# Patient Record
Sex: FEMALE | Race: WHITE | URBAN - METROPOLITAN AREA
[De-identification: names, ages, dates, MRNs, and addresses within clinical notes are randomized per-mention and may not be internally consistent; named-entity substitution may affect disease eponyms.]

---

## 2017-11-25 ENCOUNTER — EMERGENCY (EMERGENCY)
Facility: HOSPITAL | Age: 22
LOS: 0 days | Discharge: HOME | End: 2017-11-25

## 2017-11-25 DIAGNOSIS — G40.909 EPILEPSY, UNSPECIFIED, NOT INTRACTABLE, WITHOUT STATUS EPILEPTICUS: ICD-10-CM

## 2017-11-25 DIAGNOSIS — Z79.899 OTHER LONG TERM (CURRENT) DRUG THERAPY: ICD-10-CM

## 2019-12-18 ENCOUNTER — INPATIENT (INPATIENT)
Facility: HOSPITAL | Age: 24
LOS: 9 days | Discharge: HOME HEALTH | End: 2019-12-28
Attending: PLASTIC SURGERY | Admitting: PLASTIC SURGERY
Payer: COMMERCIAL

## 2019-12-18 VITALS
RESPIRATION RATE: 19 BRPM | DIASTOLIC BLOOD PRESSURE: 75 MMHG | WEIGHT: 179.9 LBS | TEMPERATURE: 98 F | SYSTOLIC BLOOD PRESSURE: 104 MMHG | OXYGEN SATURATION: 98 % | HEART RATE: 81 BPM
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
## 2019-12-18 DIAGNOSIS — S00.03XA CONTUSION OF SCALP, INITIAL ENCOUNTER: ICD-10-CM

## 2019-12-18 DIAGNOSIS — Z79.899 OTHER LONG TERM (CURRENT) DRUG THERAPY: ICD-10-CM

## 2019-12-18 DIAGNOSIS — T22.391A BURN OF THIRD DEGREE OF MULTIPLE SITES OF RIGHT SHOULDER AND UPPER LIMB, EXCEPT WRIST AND HAND, INITIAL ENCOUNTER: ICD-10-CM
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
## 2019-12-18 DIAGNOSIS — G40.909 EPILEPSY, UNSPECIFIED, NOT INTRACTABLE, WITHOUT STATUS EPILEPTICUS: ICD-10-CM

## 2019-12-18 DIAGNOSIS — T31.0 BURNS INVOLVING LESS THAN 10% OF BODY SURFACE: ICD-10-CM

## 2019-12-18 DIAGNOSIS — R71.0 PRECIPITOUS DROP IN HEMATOCRIT: ICD-10-CM

## 2019-12-18 DIAGNOSIS — T20.39XA BURN OF THIRD DEGREE OF MULTIPLE SITES OF HEAD, FACE, AND NECK, INITIAL ENCOUNTER: ICD-10-CM

## 2019-12-18 LAB
ALBUMIN SERPL ELPH-MCNC: 4.4 G/DL — SIGNIFICANT CHANGE UP (ref 3.5–5.2)
ALP SERPL-CCNC: 50 U/L — SIGNIFICANT CHANGE UP (ref 30–115)
ALT FLD-CCNC: 22 U/L — SIGNIFICANT CHANGE UP (ref 0–41)
ANION GAP SERPL CALC-SCNC: 16 MMOL/L — HIGH (ref 7–14)
APPEARANCE UR: CLEAR — SIGNIFICANT CHANGE UP
APTT BLD: 28.1 SEC — SIGNIFICANT CHANGE UP (ref 27–39.2)
AST SERPL-CCNC: 21 U/L — SIGNIFICANT CHANGE UP (ref 0–41)
BACTERIA # UR AUTO: ABNORMAL
BASOPHILS # BLD AUTO: 0.03 K/UL — SIGNIFICANT CHANGE UP (ref 0–0.2)
BASOPHILS NFR BLD AUTO: 0.2 % — SIGNIFICANT CHANGE UP (ref 0–1)
BILIRUB SERPL-MCNC: 0.4 MG/DL — SIGNIFICANT CHANGE UP (ref 0.2–1.2)
BILIRUB UR-MCNC: NEGATIVE — SIGNIFICANT CHANGE UP
BUN SERPL-MCNC: 12 MG/DL — SIGNIFICANT CHANGE UP (ref 10–20)
CALCIUM SERPL-MCNC: 9.2 MG/DL — SIGNIFICANT CHANGE UP (ref 8.5–10.1)
CHLORIDE SERPL-SCNC: 97 MMOL/L — LOW (ref 98–110)
CO2 SERPL-SCNC: 21 MMOL/L — SIGNIFICANT CHANGE UP (ref 17–32)
COLOR SPEC: YELLOW — SIGNIFICANT CHANGE UP
CREAT SERPL-MCNC: 0.6 MG/DL — LOW (ref 0.7–1.5)
DIFF PNL FLD: NEGATIVE — SIGNIFICANT CHANGE UP
EOSINOPHIL # BLD AUTO: 0.03 K/UL — SIGNIFICANT CHANGE UP (ref 0–0.7)
EOSINOPHIL NFR BLD AUTO: 0.2 % — SIGNIFICANT CHANGE UP (ref 0–8)
EPI CELLS # UR: ABNORMAL /HPF
GLUCOSE SERPL-MCNC: 88 MG/DL — SIGNIFICANT CHANGE UP (ref 70–99)
GLUCOSE UR QL: NEGATIVE MG/DL — SIGNIFICANT CHANGE UP
HCG SERPL QL: NEGATIVE — SIGNIFICANT CHANGE UP
HCT VFR BLD CALC: 44.9 % — SIGNIFICANT CHANGE UP (ref 37–47)
HGB BLD-MCNC: 15 G/DL — SIGNIFICANT CHANGE UP (ref 12–16)
IMM GRANULOCYTES NFR BLD AUTO: 0.2 % — SIGNIFICANT CHANGE UP (ref 0.1–0.3)
INR BLD: 1.04 RATIO — SIGNIFICANT CHANGE UP (ref 0.65–1.3)
KETONES UR-MCNC: NEGATIVE — SIGNIFICANT CHANGE UP
LEUKOCYTE ESTERASE UR-ACNC: ABNORMAL
LYMPHOCYTES # BLD AUTO: 1.41 K/UL — SIGNIFICANT CHANGE UP (ref 1.2–3.4)
LYMPHOCYTES # BLD AUTO: 11.1 % — LOW (ref 20.5–51.1)
MAGNESIUM SERPL-MCNC: 1.9 MG/DL — SIGNIFICANT CHANGE UP (ref 1.8–2.4)
MCHC RBC-ENTMCNC: 30.2 PG — SIGNIFICANT CHANGE UP (ref 27–31)
MCHC RBC-ENTMCNC: 33.4 G/DL — SIGNIFICANT CHANGE UP (ref 32–37)
MCV RBC AUTO: 90.3 FL — SIGNIFICANT CHANGE UP (ref 81–99)
MONOCYTES # BLD AUTO: 0.48 K/UL — SIGNIFICANT CHANGE UP (ref 0.1–0.6)
MONOCYTES NFR BLD AUTO: 3.8 % — SIGNIFICANT CHANGE UP (ref 1.7–9.3)
NEUTROPHILS # BLD AUTO: 10.68 K/UL — HIGH (ref 1.4–6.5)
NEUTROPHILS NFR BLD AUTO: 84.5 % — HIGH (ref 42.2–75.2)
NITRITE UR-MCNC: NEGATIVE — SIGNIFICANT CHANGE UP
NRBC # BLD: 0 /100 WBCS — SIGNIFICANT CHANGE UP (ref 0–0)
PH UR: 6 — SIGNIFICANT CHANGE UP (ref 5–8)
PLATELET # BLD AUTO: 222 K/UL — SIGNIFICANT CHANGE UP (ref 130–400)
POTASSIUM SERPL-MCNC: 3.8 MMOL/L — SIGNIFICANT CHANGE UP (ref 3.5–5)
POTASSIUM SERPL-SCNC: 3.8 MMOL/L — SIGNIFICANT CHANGE UP (ref 3.5–5)
PROT SERPL-MCNC: 7.1 G/DL — SIGNIFICANT CHANGE UP (ref 6–8)
PROT UR-MCNC: ABNORMAL MG/DL
PROTHROM AB SERPL-ACNC: 12 SEC — SIGNIFICANT CHANGE UP (ref 9.95–12.87)
RBC # BLD: 4.97 M/UL — SIGNIFICANT CHANGE UP (ref 4.2–5.4)
RBC # FLD: 12.6 % — SIGNIFICANT CHANGE UP (ref 11.5–14.5)
SODIUM SERPL-SCNC: 134 MMOL/L — LOW (ref 135–146)
SP GR SPEC: 1.02 — SIGNIFICANT CHANGE UP (ref 1.01–1.03)
UROBILINOGEN FLD QL: 0.2 MG/DL — SIGNIFICANT CHANGE UP (ref 0.2–0.2)
WBC # BLD: 12.66 K/UL — HIGH (ref 4.8–10.8)
WBC # FLD AUTO: 12.66 K/UL — HIGH (ref 4.8–10.8)
WBC UR QL: SIGNIFICANT CHANGE UP /HPF

## 2019-12-18 PROCEDURE — 70486 CT MAXILLOFACIAL W/O DYE: CPT | Mod: 26

## 2019-12-18 PROCEDURE — 70450 CT HEAD/BRAIN W/O DYE: CPT | Mod: 26

## 2019-12-18 PROCEDURE — 99285 EMERGENCY DEPT VISIT HI MDM: CPT

## 2019-12-18 PROCEDURE — 99223 1ST HOSP IP/OBS HIGH 75: CPT

## 2019-12-18 RX ORDER — BACITRACIN ZINC 500 UNIT/G
1 OINTMENT IN PACKET (EA) TOPICAL
Refills: 0 | Status: DISCONTINUED | OUTPATIENT
Start: 2019-12-18 | End: 2019-12-23

## 2019-12-18 RX ORDER — NORETHINDRONE AND ETHINYL ESTRADIOL 0.4-0.035
1 KIT ORAL
Qty: 0 | Refills: 0 | DISCHARGE

## 2019-12-18 RX ORDER — INFLUENZA VIRUS VACCINE 15; 15; 15; 15 UG/.5ML; UG/.5ML; UG/.5ML; UG/.5ML
0.5 SUSPENSION INTRAMUSCULAR ONCE
Refills: 0 | Status: COMPLETED | OUTPATIENT
Start: 2019-12-18 | End: 2019-12-28

## 2019-12-18 RX ORDER — LEVETIRACETAM 250 MG/1
500 TABLET, FILM COATED ORAL DAILY
Refills: 0 | Status: DISCONTINUED | OUTPATIENT
Start: 2019-12-18 | End: 2019-12-19

## 2019-12-18 RX ORDER — SODIUM CHLORIDE 9 MG/ML
1000 INJECTION INTRAMUSCULAR; INTRAVENOUS; SUBCUTANEOUS ONCE
Refills: 0 | Status: COMPLETED | OUTPATIENT
Start: 2019-12-18 | End: 2019-12-18

## 2019-12-18 RX ORDER — NORETHINDRONE AND ETHINYL ESTRADIOL 0.4-0.035
1 KIT ORAL DAILY
Refills: 0 | Status: DISCONTINUED | OUTPATIENT
Start: 2019-12-18 | End: 2019-12-23

## 2019-12-18 RX ORDER — SODIUM CHLORIDE 9 MG/ML
1000 INJECTION INTRAMUSCULAR; INTRAVENOUS; SUBCUTANEOUS
Refills: 0 | Status: DISCONTINUED | OUTPATIENT
Start: 2019-12-18 | End: 2019-12-23

## 2019-12-18 RX ORDER — MORPHINE SULFATE 50 MG/1
2 CAPSULE, EXTENDED RELEASE ORAL EVERY 6 HOURS
Refills: 0 | Status: DISCONTINUED | OUTPATIENT
Start: 2019-12-18 | End: 2019-12-18

## 2019-12-18 RX ORDER — OXYCODONE AND ACETAMINOPHEN 5; 325 MG/1; MG/1
2 TABLET ORAL EVERY 6 HOURS
Refills: 0 | Status: DISCONTINUED | OUTPATIENT
Start: 2019-12-18 | End: 2019-12-19

## 2019-12-18 RX ORDER — ENOXAPARIN SODIUM 100 MG/ML
40 INJECTION SUBCUTANEOUS DAILY
Refills: 0 | Status: DISCONTINUED | OUTPATIENT
Start: 2019-12-19 | End: 2019-12-23

## 2019-12-18 RX ORDER — ACETAMINOPHEN 500 MG
650 TABLET ORAL EVERY 6 HOURS
Refills: 0 | Status: DISCONTINUED | OUTPATIENT
Start: 2019-12-18 | End: 2019-12-23

## 2019-12-18 RX ORDER — MORPHINE SULFATE 50 MG/1
2 CAPSULE, EXTENDED RELEASE ORAL EVERY 4 HOURS
Refills: 0 | Status: DISCONTINUED | OUTPATIENT
Start: 2019-12-18 | End: 2019-12-20

## 2019-12-18 RX ORDER — MORPHINE SULFATE 50 MG/1
4 CAPSULE, EXTENDED RELEASE ORAL ONCE
Refills: 0 | Status: DISCONTINUED | OUTPATIENT
Start: 2019-12-18 | End: 2019-12-18

## 2019-12-18 RX ORDER — FELBAMATE 600 MG/1
600 TABLET ORAL THREE TIMES A DAY
Refills: 0 | Status: DISCONTINUED | OUTPATIENT
Start: 2019-12-18 | End: 2019-12-23

## 2019-12-18 RX ORDER — TETANUS TOXOID, REDUCED DIPHTHERIA TOXOID AND ACELLULAR PERTUSSIS VACCINE, ADSORBED 5; 2.5; 8; 8; 2.5 [IU]/.5ML; [IU]/.5ML; UG/.5ML; UG/.5ML; UG/.5ML
0.5 SUSPENSION INTRAMUSCULAR ONCE
Refills: 0 | Status: COMPLETED | OUTPATIENT
Start: 2019-12-18 | End: 2019-12-18

## 2019-12-18 RX ORDER — SENNA PLUS 8.6 MG/1
2 TABLET ORAL AT BEDTIME
Refills: 0 | Status: DISCONTINUED | OUTPATIENT
Start: 2019-12-18 | End: 2019-12-23

## 2019-12-18 RX ORDER — SODIUM CHLORIDE 9 MG/ML
1000 INJECTION, SOLUTION INTRAVENOUS
Refills: 0 | Status: DISCONTINUED | OUTPATIENT
Start: 2019-12-18 | End: 2019-12-18

## 2019-12-18 RX ORDER — NAFCILLIN 10 G/100ML
1 INJECTION, POWDER, FOR SOLUTION INTRAVENOUS EVERY 6 HOURS
Refills: 0 | Status: DISCONTINUED | OUTPATIENT
Start: 2019-12-18 | End: 2019-12-20

## 2019-12-18 RX ORDER — MIDAZOLAM HYDROCHLORIDE 1 MG/ML
1 INJECTION, SOLUTION INTRAMUSCULAR; INTRAVENOUS
Refills: 0 | Status: DISCONTINUED | OUTPATIENT
Start: 2019-12-18 | End: 2019-12-23

## 2019-12-18 RX ORDER — MORPHINE SULFATE 50 MG/1
2 CAPSULE, EXTENDED RELEASE ORAL
Refills: 0 | Status: DISCONTINUED | OUTPATIENT
Start: 2019-12-18 | End: 2019-12-23

## 2019-12-18 RX ORDER — BACITRACIN AND POLYMYXIN B SULFATE 500; 10000 [USP'U]/G; [USP'U]/G
1 OINTMENT TOPICAL
Refills: 0 | Status: DISCONTINUED | OUTPATIENT
Start: 2019-12-18 | End: 2019-12-23

## 2019-12-18 RX ADMIN — MORPHINE SULFATE 2 MILLIGRAM(S): 50 CAPSULE, EXTENDED RELEASE ORAL at 22:34

## 2019-12-18 RX ADMIN — OXYCODONE AND ACETAMINOPHEN 2 TABLET(S): 5; 325 TABLET ORAL at 23:15

## 2019-12-18 RX ADMIN — FELBAMATE 600 MILLIGRAM(S): 600 TABLET ORAL at 22:00

## 2019-12-18 RX ADMIN — NAFCILLIN 100 GRAM(S): 10 INJECTION, POWDER, FOR SOLUTION INTRAVENOUS at 23:11

## 2019-12-18 RX ADMIN — MORPHINE SULFATE 2 MILLIGRAM(S): 50 CAPSULE, EXTENDED RELEASE ORAL at 22:05

## 2019-12-18 RX ADMIN — TETANUS TOXOID, REDUCED DIPHTHERIA TOXOID AND ACELLULAR PERTUSSIS VACCINE, ADSORBED 0.5 MILLILITER(S): 5; 2.5; 8; 8; 2.5 SUSPENSION INTRAMUSCULAR at 13:09

## 2019-12-18 RX ADMIN — MORPHINE SULFATE 2 MILLIGRAM(S): 50 CAPSULE, EXTENDED RELEASE ORAL at 20:55

## 2019-12-18 RX ADMIN — SODIUM CHLORIDE 75 MILLILITER(S): 9 INJECTION INTRAMUSCULAR; INTRAVENOUS; SUBCUTANEOUS at 23:11

## 2019-12-18 RX ADMIN — SODIUM CHLORIDE 1000 MILLILITER(S): 9 INJECTION INTRAMUSCULAR; INTRAVENOUS; SUBCUTANEOUS at 13:10

## 2019-12-18 RX ADMIN — MORPHINE SULFATE 4 MILLIGRAM(S): 50 CAPSULE, EXTENDED RELEASE ORAL at 18:31

## 2019-12-18 RX ADMIN — MORPHINE SULFATE 4 MILLIGRAM(S): 50 CAPSULE, EXTENDED RELEASE ORAL at 13:08

## 2019-12-18 RX ADMIN — MORPHINE SULFATE 2 MILLIGRAM(S): 50 CAPSULE, EXTENDED RELEASE ORAL at 20:27

## 2019-12-18 RX ADMIN — Medication 1 DROP(S): at 22:34
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
## 2019-12-18 NOTE — ED ADULT NURSE NOTE - NSIMPLEMENTINTERV_GEN_ALL_ED
Implemented All Fall with Harm Risk Interventions:  [place] to call system. Call bell, personal items and telephone within reach. Instruct patient to call for assistance. Room bathroom lighting operational. Non-slip footwear when patient is off stretcher. Physically safe environment: no spills, clutter or unnecessary equipment. Stretcher in lowest position, wheels locked, appropriate side rails in place. Provide visual cue, wrist band, yellow gown, etc. Monitor gait and stability. Monitor for mental status changes and reorient to person, place, and time. Review medications for side effects contributing to fall risk. Reinforce activity limits and safety measures with patient and family. Provide visual clues: red socks.
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
## 2019-12-18 NOTE — H&P ADULT - NSHPLABSRESULTS_GEN_ALL_CORE
LABS: All Labs Reviewed:                        15.0   12.66 )-----------( 222      ( 18 Dec 2019 13:10 )             44.9     12-18    134<L>  |  97<L>  |  12  ----------------------------<  88  3.8   |  21  |  0.6<L>    Ca    9.2      18 Dec 2019 13:10  Mg     1.9     12-18    TPro  7.1  /  Alb  4.4  /  TBili  0.4  /  DBili  x   /  AST  21  /  ALT  22  /  AlkPhos  50  12-18    PT/INR - ( 18 Dec 2019 13:10 )   PT: 12.00 sec;   INR: 1.04 ratio         PTT - ( 18 Dec 2019 13:10 )  PTT:28.1 sec       radiology: < from: CT Head No Cont (12.18.19 @ 12:23) >      No acute intracranial hemorrhage, mass effect, or acute osseous fracture.   Right frontotemporal scalp hematoma.    < end of copied text >    < from: CT Maxillofacial No Cont (12.18.19 @ 16:25) >    Right frontal and periorbital extracalvarial soft tissue swelling/hematoma. No evidence of underlying fracture. No evidence of globe injury.    < end of copied text >
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
## 2019-12-19 LAB
ANION GAP SERPL CALC-SCNC: 19 MMOL/L — HIGH (ref 7–14)
BASOPHILS # BLD AUTO: 0.02 K/UL — SIGNIFICANT CHANGE UP (ref 0–0.2)
BASOPHILS NFR BLD AUTO: 0.2 % — SIGNIFICANT CHANGE UP (ref 0–1)
BUN SERPL-MCNC: 7 MG/DL — LOW (ref 10–20)
CALCIUM SERPL-MCNC: 8.8 MG/DL — SIGNIFICANT CHANGE UP (ref 8.5–10.1)
CHLORIDE SERPL-SCNC: 99 MMOL/L — SIGNIFICANT CHANGE UP (ref 98–110)
CO2 SERPL-SCNC: 18 MMOL/L — SIGNIFICANT CHANGE UP (ref 17–32)
CREAT SERPL-MCNC: 0.7 MG/DL — SIGNIFICANT CHANGE UP (ref 0.7–1.5)
EOSINOPHIL # BLD AUTO: 0 K/UL — SIGNIFICANT CHANGE UP (ref 0–0.7)
EOSINOPHIL NFR BLD AUTO: 0 % — SIGNIFICANT CHANGE UP (ref 0–8)
GLUCOSE SERPL-MCNC: 91 MG/DL — SIGNIFICANT CHANGE UP (ref 70–99)
HCT VFR BLD CALC: 44.9 % — SIGNIFICANT CHANGE UP (ref 37–47)
HGB BLD-MCNC: 14.7 G/DL — SIGNIFICANT CHANGE UP (ref 12–16)
IMM GRANULOCYTES NFR BLD AUTO: 0.8 % — HIGH (ref 0.1–0.3)
LYMPHOCYTES # BLD AUTO: 0.9 K/UL — LOW (ref 1.2–3.4)
LYMPHOCYTES # BLD AUTO: 7.1 % — LOW (ref 20.5–51.1)
MAGNESIUM SERPL-MCNC: 1.9 MG/DL — SIGNIFICANT CHANGE UP (ref 1.8–2.4)
MCHC RBC-ENTMCNC: 30.2 PG — SIGNIFICANT CHANGE UP (ref 27–31)
MCHC RBC-ENTMCNC: 32.7 G/DL — SIGNIFICANT CHANGE UP (ref 32–37)
MCV RBC AUTO: 92.4 FL — SIGNIFICANT CHANGE UP (ref 81–99)
MONOCYTES # BLD AUTO: 1.18 K/UL — HIGH (ref 0.1–0.6)
MONOCYTES NFR BLD AUTO: 9.4 % — HIGH (ref 1.7–9.3)
NEUTROPHILS # BLD AUTO: 10.39 K/UL — HIGH (ref 1.4–6.5)
NEUTROPHILS NFR BLD AUTO: 82.5 % — HIGH (ref 42.2–75.2)
NRBC # BLD: 0 /100 WBCS — SIGNIFICANT CHANGE UP (ref 0–0)
PHOSPHATE SERPL-MCNC: 2.6 MG/DL — SIGNIFICANT CHANGE UP (ref 2.1–4.9)
PLATELET # BLD AUTO: 188 K/UL — SIGNIFICANT CHANGE UP (ref 130–400)
POTASSIUM SERPL-MCNC: 4.7 MMOL/L — SIGNIFICANT CHANGE UP (ref 3.5–5)
POTASSIUM SERPL-SCNC: 4.7 MMOL/L — SIGNIFICANT CHANGE UP (ref 3.5–5)
RBC # BLD: 4.86 M/UL — SIGNIFICANT CHANGE UP (ref 4.2–5.4)
RBC # FLD: 12.8 % — SIGNIFICANT CHANGE UP (ref 11.5–14.5)
SODIUM SERPL-SCNC: 136 MMOL/L — SIGNIFICANT CHANGE UP (ref 135–146)
WBC # BLD: 12.59 K/UL — HIGH (ref 4.8–10.8)
WBC # FLD AUTO: 12.59 K/UL — HIGH (ref 4.8–10.8)

## 2019-12-19 PROCEDURE — 99222 1ST HOSP IP/OBS MODERATE 55: CPT

## 2019-12-19 PROCEDURE — 99231 SBSQ HOSP IP/OBS SF/LOW 25: CPT | Mod: 25

## 2019-12-19 PROCEDURE — 16020 DRESS/DEBRID P-THICK BURN S: CPT

## 2019-12-19 RX ORDER — LEVETIRACETAM 250 MG/1
500 TABLET, FILM COATED ORAL EVERY 12 HOURS
Refills: 0 | Status: DISCONTINUED | OUTPATIENT
Start: 2019-12-19 | End: 2019-12-23

## 2019-12-19 RX ORDER — ONDANSETRON 8 MG/1
4 TABLET, FILM COATED ORAL ONCE
Refills: 0 | Status: COMPLETED | OUTPATIENT
Start: 2019-12-19 | End: 2019-12-19

## 2019-12-19 RX ORDER — ONDANSETRON 8 MG/1
4 TABLET, FILM COATED ORAL EVERY 6 HOURS
Refills: 0 | Status: DISCONTINUED | OUTPATIENT
Start: 2019-12-19 | End: 2019-12-23

## 2019-12-19 RX ORDER — MAGNESIUM SULFATE 500 MG/ML
2 VIAL (ML) INJECTION ONCE
Refills: 0 | Status: COMPLETED | OUTPATIENT
Start: 2019-12-19 | End: 2019-12-19

## 2019-12-19 RX ORDER — OXYCODONE AND ACETAMINOPHEN 5; 325 MG/1; MG/1
2 TABLET ORAL EVERY 4 HOURS
Refills: 0 | Status: DISCONTINUED | OUTPATIENT
Start: 2019-12-19 | End: 2019-12-19

## 2019-12-19 RX ORDER — KETOROLAC TROMETHAMINE 30 MG/ML
15 SYRINGE (ML) INJECTION EVERY 6 HOURS
Refills: 0 | Status: DISCONTINUED | OUTPATIENT
Start: 2019-12-19 | End: 2019-12-22

## 2019-12-19 RX ADMIN — MORPHINE SULFATE 2 MILLIGRAM(S): 50 CAPSULE, EXTENDED RELEASE ORAL at 02:11

## 2019-12-19 RX ADMIN — BACITRACIN AND POLYMYXIN B SULFATE 1 APPLICATION(S): 500; 10000 OINTMENT TOPICAL at 18:32

## 2019-12-19 RX ADMIN — Medication 1 APPLICATION(S): at 18:42

## 2019-12-19 RX ADMIN — Medication 15 MILLIGRAM(S): at 18:23

## 2019-12-19 RX ADMIN — MORPHINE SULFATE 2 MILLIGRAM(S): 50 CAPSULE, EXTENDED RELEASE ORAL at 14:00

## 2019-12-19 RX ADMIN — FELBAMATE 600 MILLIGRAM(S): 600 TABLET ORAL at 13:45

## 2019-12-19 RX ADMIN — Medication 1 TABLET(S): at 13:44

## 2019-12-19 RX ADMIN — FELBAMATE 600 MILLIGRAM(S): 600 TABLET ORAL at 05:50

## 2019-12-19 RX ADMIN — Medication 1 APPLICATION(S): at 05:51

## 2019-12-19 RX ADMIN — Medication 50 GRAM(S): at 23:38

## 2019-12-19 RX ADMIN — MORPHINE SULFATE 2 MILLIGRAM(S): 50 CAPSULE, EXTENDED RELEASE ORAL at 13:30

## 2019-12-19 RX ADMIN — LEVETIRACETAM 500 MILLIGRAM(S): 250 TABLET, FILM COATED ORAL at 18:23

## 2019-12-19 RX ADMIN — Medication 1 DROP(S): at 01:22

## 2019-12-19 RX ADMIN — NAFCILLIN 100 GRAM(S): 10 INJECTION, POWDER, FOR SOLUTION INTRAVENOUS at 05:51

## 2019-12-19 RX ADMIN — Medication 1 DROP(S): at 05:49

## 2019-12-19 RX ADMIN — MORPHINE SULFATE 2 MILLIGRAM(S): 50 CAPSULE, EXTENDED RELEASE ORAL at 19:06

## 2019-12-19 RX ADMIN — Medication 1 APPLICATION(S): at 18:30

## 2019-12-19 RX ADMIN — OXYCODONE AND ACETAMINOPHEN 2 TABLET(S): 5; 325 TABLET ORAL at 03:41

## 2019-12-19 RX ADMIN — Medication 1 DROP(S): at 13:58

## 2019-12-19 RX ADMIN — NAFCILLIN 100 GRAM(S): 10 INJECTION, POWDER, FOR SOLUTION INTRAVENOUS at 19:46

## 2019-12-19 RX ADMIN — Medication 1 APPLICATION(S): at 10:57

## 2019-12-19 RX ADMIN — BACITRACIN AND POLYMYXIN B SULFATE 1 APPLICATION(S): 500; 10000 OINTMENT TOPICAL at 05:51

## 2019-12-19 RX ADMIN — Medication 1 DROP(S): at 21:06

## 2019-12-19 RX ADMIN — OXYCODONE AND ACETAMINOPHEN 2 TABLET(S): 5; 325 TABLET ORAL at 00:30

## 2019-12-19 RX ADMIN — Medication 1 APPLICATION(S): at 10:58

## 2019-12-19 RX ADMIN — MORPHINE SULFATE 2 MILLIGRAM(S): 50 CAPSULE, EXTENDED RELEASE ORAL at 02:50

## 2019-12-19 RX ADMIN — Medication 1 DROP(S): at 18:28

## 2019-12-19 RX ADMIN — NORETHINDRONE AND ETHINYL ESTRADIOL 1 TABLET(S): KIT at 21:05

## 2019-12-19 RX ADMIN — NAFCILLIN 100 GRAM(S): 10 INJECTION, POWDER, FOR SOLUTION INTRAVENOUS at 13:43

## 2019-12-19 RX ADMIN — OXYCODONE AND ACETAMINOPHEN 2 TABLET(S): 5; 325 TABLET ORAL at 04:30

## 2019-12-19 RX ADMIN — MORPHINE SULFATE 2 MILLIGRAM(S): 50 CAPSULE, EXTENDED RELEASE ORAL at 07:48

## 2019-12-19 RX ADMIN — ONDANSETRON 4 MILLIGRAM(S): 8 TABLET, FILM COATED ORAL at 11:30

## 2019-12-19 RX ADMIN — MORPHINE SULFATE 2 MILLIGRAM(S): 50 CAPSULE, EXTENDED RELEASE ORAL at 08:20

## 2019-12-19 RX ADMIN — Medication 1 DROP(S): at 10:57

## 2019-12-19 RX ADMIN — FELBAMATE 600 MILLIGRAM(S): 600 TABLET ORAL at 21:04

## 2019-12-19 RX ADMIN — MIDAZOLAM HYDROCHLORIDE 1 MILLIGRAM(S): 1 INJECTION, SOLUTION INTRAMUSCULAR; INTRAVENOUS at 10:24

## 2019-12-19 RX ADMIN — MORPHINE SULFATE 2 MILLIGRAM(S): 50 CAPSULE, EXTENDED RELEASE ORAL at 10:24

## 2019-12-19 RX ADMIN — MORPHINE SULFATE 2 MILLIGRAM(S): 50 CAPSULE, EXTENDED RELEASE ORAL at 10:25

## 2019-12-19 RX ADMIN — MORPHINE SULFATE 2 MILLIGRAM(S): 50 CAPSULE, EXTENDED RELEASE ORAL at 18:42

## 2019-12-19 RX ADMIN — ENOXAPARIN SODIUM 40 MILLIGRAM(S): 100 INJECTION SUBCUTANEOUS at 13:44

## 2019-12-19 RX ADMIN — NAFCILLIN 100 GRAM(S): 10 INJECTION, POWDER, FOR SOLUTION INTRAVENOUS at 23:38

## 2019-12-19 RX ADMIN — Medication 650 MILLIGRAM(S): at 23:41
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
## 2019-12-19 NOTE — CONSULT NOTE ADULT - ATTENDING COMMENTS
Patient seen today for seizure.  She has seizure d/o for years and is being followed by a neurologist.  Denies missing med doses.  Keppra had been higher in past but lowered due to headaches though she is willing to increase to 500 mg bid in addition to continuing her felbamate 600 mg tid until she sees her neurologist in followup.  Sleep/stress may have been factors in breakthrough seizure.    Recommend outpatient f/u with her neurologist in 2 weeks.    Please call back for questions.
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
## 2019-12-19 NOTE — PROGRESS NOTE ADULT - SUBJECTIVE AND OBJECTIVE BOX
AM rounds   Pt resting ' mother at bedside;   Pt: complains of pain and nausea following   No acute events o/n  Vital Signs Last 24 Hrs  T(C): 38.1 (19 Dec 2019 16:32), Max: 38.1 (19 Dec 2019 16:32)  T(F): 100.6 (19 Dec 2019 16:32), Max: 100.6 (19 Dec 2019 16:32)  HR: 98 (19 Dec 2019 16:32) (72 - 98)  BP: 128/67 (19 Dec 2019 16:32) (98/54 - 128/67)  RR: 18 (19 Dec 2019 16:32) (18 - 18)  SpO2: 98% (19 Dec 2019 16:32) (98% - 98%)      I&O's Summary    18 Dec 2019 07:01  -  19 Dec 2019 07:00  --------------------------------------------------------  IN: 800 mL / OUT: 0 mL / NET: 800 mL      12-18    134<L>  |  97<L>  |  12  ----------------------------<  88  3.8   |  21  |  0.6<L>    Ca    9.2      18 Dec 2019 13:10  Mg     1.9     12-18    TPro  7.1  /  Alb  4.4  /  TBili  0.4  /  DBili  x   /  AST  21  /  ALT  22  /  AlkPhos  50  12-18                          15.0   12.66 )-----------( 222      ( 18 Dec 2019 13:10 )             44.9      CT Maxillofacial No Cont (12.18.19 @ 16:25) >  IMPRESSION:    Right frontal and periorbital extracalvarial soft tissue swelling/hematoma. No evidence of underlying fracture. No evidence of globe injury.   CT Head - negative     EXAM:   right periorbital swelling and deep ecchymosis  open large wounds right forehead, arm and small wound wrist       large dressing change done
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
## 2019-12-19 NOTE — OCCUPATIONAL THERAPY INITIAL EVALUATION ADULT - GENERAL OBSERVATIONS, REHAB EVAL
Pt. encountered in bed and cooperative to OT tx. + IV. Mother present at bedside throughout session.
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
## 2019-12-20 LAB
ANION GAP SERPL CALC-SCNC: 13 MMOL/L — SIGNIFICANT CHANGE UP (ref 7–14)
BASOPHILS # BLD AUTO: 0.04 K/UL — SIGNIFICANT CHANGE UP (ref 0–0.2)
BASOPHILS NFR BLD AUTO: 0.3 % — SIGNIFICANT CHANGE UP (ref 0–1)
BUN SERPL-MCNC: 9 MG/DL — LOW (ref 10–20)
CALCIUM SERPL-MCNC: 8.4 MG/DL — LOW (ref 8.5–10.1)
CHLORIDE SERPL-SCNC: 103 MMOL/L — SIGNIFICANT CHANGE UP (ref 98–110)
CO2 SERPL-SCNC: 23 MMOL/L — SIGNIFICANT CHANGE UP (ref 17–32)
CREAT SERPL-MCNC: 0.6 MG/DL — LOW (ref 0.7–1.5)
EOSINOPHIL # BLD AUTO: 0.05 K/UL — SIGNIFICANT CHANGE UP (ref 0–0.7)
EOSINOPHIL NFR BLD AUTO: 0.4 % — SIGNIFICANT CHANGE UP (ref 0–8)
GLUCOSE SERPL-MCNC: 91 MG/DL — SIGNIFICANT CHANGE UP (ref 70–99)
HCT VFR BLD CALC: 40.2 % — SIGNIFICANT CHANGE UP (ref 37–47)
HGB BLD-MCNC: 13.3 G/DL — SIGNIFICANT CHANGE UP (ref 12–16)
IMM GRANULOCYTES NFR BLD AUTO: 0.5 % — HIGH (ref 0.1–0.3)
LYMPHOCYTES # BLD AUTO: 1.83 K/UL — SIGNIFICANT CHANGE UP (ref 1.2–3.4)
LYMPHOCYTES # BLD AUTO: 15.6 % — LOW (ref 20.5–51.1)
MAGNESIUM SERPL-MCNC: 2.2 MG/DL — SIGNIFICANT CHANGE UP (ref 1.8–2.4)
MCHC RBC-ENTMCNC: 30.4 PG — SIGNIFICANT CHANGE UP (ref 27–31)
MCHC RBC-ENTMCNC: 33.1 G/DL — SIGNIFICANT CHANGE UP (ref 32–37)
MCV RBC AUTO: 91.8 FL — SIGNIFICANT CHANGE UP (ref 81–99)
MONOCYTES # BLD AUTO: 1.4 K/UL — HIGH (ref 0.1–0.6)
MONOCYTES NFR BLD AUTO: 11.9 % — HIGH (ref 1.7–9.3)
NEUTROPHILS # BLD AUTO: 8.38 K/UL — HIGH (ref 1.4–6.5)
NEUTROPHILS NFR BLD AUTO: 71.3 % — SIGNIFICANT CHANGE UP (ref 42.2–75.2)
NRBC # BLD: 0 /100 WBCS — SIGNIFICANT CHANGE UP (ref 0–0)
PHOSPHATE SERPL-MCNC: 2.6 MG/DL — SIGNIFICANT CHANGE UP (ref 2.1–4.9)
PLATELET # BLD AUTO: 191 K/UL — SIGNIFICANT CHANGE UP (ref 130–400)
POTASSIUM SERPL-MCNC: 4.2 MMOL/L — SIGNIFICANT CHANGE UP (ref 3.5–5)
POTASSIUM SERPL-SCNC: 4.2 MMOL/L — SIGNIFICANT CHANGE UP (ref 3.5–5)
RBC # BLD: 4.38 M/UL — SIGNIFICANT CHANGE UP (ref 4.2–5.4)
RBC # FLD: 12.9 % — SIGNIFICANT CHANGE UP (ref 11.5–14.5)
SODIUM SERPL-SCNC: 139 MMOL/L — SIGNIFICANT CHANGE UP (ref 135–146)
WBC # BLD: 11.76 K/UL — HIGH (ref 4.8–10.8)
WBC # FLD AUTO: 11.76 K/UL — HIGH (ref 4.8–10.8)

## 2019-12-20 PROCEDURE — 16020 DRESS/DEBRID P-THICK BURN S: CPT

## 2019-12-20 PROCEDURE — 99231 SBSQ HOSP IP/OBS SF/LOW 25: CPT | Mod: 25

## 2019-12-20 RX ORDER — AMPICILLIN SODIUM AND SULBACTAM SODIUM 250; 125 MG/ML; MG/ML
INJECTION, POWDER, FOR SUSPENSION INTRAMUSCULAR; INTRAVENOUS
Refills: 0 | Status: DISCONTINUED | OUTPATIENT
Start: 2019-12-20 | End: 2019-12-22

## 2019-12-20 RX ORDER — AMPICILLIN SODIUM AND SULBACTAM SODIUM 250; 125 MG/ML; MG/ML
3 INJECTION, POWDER, FOR SUSPENSION INTRAMUSCULAR; INTRAVENOUS ONCE
Refills: 0 | Status: COMPLETED | OUTPATIENT
Start: 2019-12-20 | End: 2019-12-20

## 2019-12-20 RX ORDER — OXYCODONE AND ACETAMINOPHEN 5; 325 MG/1; MG/1
2 TABLET ORAL EVERY 4 HOURS
Refills: 0 | Status: DISCONTINUED | OUTPATIENT
Start: 2019-12-20 | End: 2019-12-23

## 2019-12-20 RX ORDER — AMPICILLIN SODIUM AND SULBACTAM SODIUM 250; 125 MG/ML; MG/ML
3 INJECTION, POWDER, FOR SUSPENSION INTRAMUSCULAR; INTRAVENOUS EVERY 6 HOURS
Refills: 0 | Status: DISCONTINUED | OUTPATIENT
Start: 2019-12-20 | End: 2019-12-22

## 2019-12-20 RX ADMIN — Medication 1 APPLICATION(S): at 22:31

## 2019-12-20 RX ADMIN — MIDAZOLAM HYDROCHLORIDE 1 MILLIGRAM(S): 1 INJECTION, SOLUTION INTRAMUSCULAR; INTRAVENOUS at 21:51

## 2019-12-20 RX ADMIN — Medication 1 DROP(S): at 10:46

## 2019-12-20 RX ADMIN — LEVETIRACETAM 500 MILLIGRAM(S): 250 TABLET, FILM COATED ORAL at 18:00

## 2019-12-20 RX ADMIN — Medication 1 DROP(S): at 02:01

## 2019-12-20 RX ADMIN — ENOXAPARIN SODIUM 40 MILLIGRAM(S): 100 INJECTION SUBCUTANEOUS at 13:56

## 2019-12-20 RX ADMIN — LEVETIRACETAM 500 MILLIGRAM(S): 250 TABLET, FILM COATED ORAL at 06:04

## 2019-12-20 RX ADMIN — Medication 650 MILLIGRAM(S): at 01:00

## 2019-12-20 RX ADMIN — MORPHINE SULFATE 2 MILLIGRAM(S): 50 CAPSULE, EXTENDED RELEASE ORAL at 22:34

## 2019-12-20 RX ADMIN — FELBAMATE 600 MILLIGRAM(S): 600 TABLET ORAL at 13:56

## 2019-12-20 RX ADMIN — Medication 1 TABLET(S): at 13:55

## 2019-12-20 RX ADMIN — FELBAMATE 600 MILLIGRAM(S): 600 TABLET ORAL at 05:02

## 2019-12-20 RX ADMIN — FELBAMATE 600 MILLIGRAM(S): 600 TABLET ORAL at 21:14

## 2019-12-20 RX ADMIN — Medication 15 MILLIGRAM(S): at 10:28

## 2019-12-20 RX ADMIN — Medication 1 DROP(S): at 18:00

## 2019-12-20 RX ADMIN — NORETHINDRONE AND ETHINYL ESTRADIOL 1 TABLET(S): KIT at 21:18

## 2019-12-20 RX ADMIN — AMPICILLIN SODIUM AND SULBACTAM SODIUM 200 GRAM(S): 250; 125 INJECTION, POWDER, FOR SUSPENSION INTRAMUSCULAR; INTRAVENOUS at 23:52

## 2019-12-20 RX ADMIN — AMPICILLIN SODIUM AND SULBACTAM SODIUM 200 GRAM(S): 250; 125 INJECTION, POWDER, FOR SUSPENSION INTRAMUSCULAR; INTRAVENOUS at 17:59

## 2019-12-20 RX ADMIN — ONDANSETRON 4 MILLIGRAM(S): 8 TABLET, FILM COATED ORAL at 21:50

## 2019-12-20 RX ADMIN — Medication 15 MILLIGRAM(S): at 08:15

## 2019-12-20 RX ADMIN — MORPHINE SULFATE 2 MILLIGRAM(S): 50 CAPSULE, EXTENDED RELEASE ORAL at 21:50

## 2019-12-20 RX ADMIN — BACITRACIN AND POLYMYXIN B SULFATE 1 APPLICATION(S): 500; 10000 OINTMENT TOPICAL at 05:06

## 2019-12-20 RX ADMIN — Medication 1 APPLICATION(S): at 18:01

## 2019-12-20 RX ADMIN — OXYCODONE AND ACETAMINOPHEN 2 TABLET(S): 5; 325 TABLET ORAL at 18:33

## 2019-12-20 RX ADMIN — NAFCILLIN 100 GRAM(S): 10 INJECTION, POWDER, FOR SOLUTION INTRAVENOUS at 05:02

## 2019-12-20 RX ADMIN — Medication 1 APPLICATION(S): at 10:28

## 2019-12-20 RX ADMIN — Medication 1 DROP(S): at 05:03

## 2019-12-20 RX ADMIN — BACITRACIN AND POLYMYXIN B SULFATE 1 APPLICATION(S): 500; 10000 OINTMENT TOPICAL at 18:01

## 2019-12-20 RX ADMIN — Medication 1 DROP(S): at 13:56

## 2019-12-20 RX ADMIN — Medication 1 APPLICATION(S): at 05:04

## 2019-12-20 RX ADMIN — OXYCODONE AND ACETAMINOPHEN 2 TABLET(S): 5; 325 TABLET ORAL at 19:18

## 2019-12-20 RX ADMIN — Medication 1 APPLICATION(S): at 10:27

## 2019-12-20 RX ADMIN — Medication 1 DROP(S): at 21:15

## 2019-12-20 RX ADMIN — Medication 15 MILLIGRAM(S): at 15:55

## 2019-12-20 RX ADMIN — Medication 650 MILLIGRAM(S): at 12:51

## 2019-12-20 RX ADMIN — AMPICILLIN SODIUM AND SULBACTAM SODIUM 200 GRAM(S): 250; 125 INJECTION, POWDER, FOR SUSPENSION INTRAMUSCULAR; INTRAVENOUS at 10:46
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
## 2019-12-20 NOTE — DIETITIAN INITIAL EVALUATION ADULT. - FACTORS AFF FOOD INTAKE
other (specify)/denies difficulty swallowing/chewing; c/o some nausea yesterday, however none today. no bm since adm

## 2019-12-20 NOTE — DIETITIAN INITIAL EVALUATION ADULT. - FEEDING SKILL
Pt was nauseous all day yesterday and couldn't eat, however nausea has subsided and pt ate 100% of her breakfast today. Not interested in oral supplements at this time as po intake is improving./independent
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
## 2019-12-21 LAB
ANION GAP SERPL CALC-SCNC: 10 MMOL/L — SIGNIFICANT CHANGE UP (ref 7–14)
BASOPHILS # BLD AUTO: 0.03 K/UL — SIGNIFICANT CHANGE UP (ref 0–0.2)
BASOPHILS NFR BLD AUTO: 0.3 % — SIGNIFICANT CHANGE UP (ref 0–1)
BUN SERPL-MCNC: 9 MG/DL — LOW (ref 10–20)
CALCIUM SERPL-MCNC: 8.4 MG/DL — LOW (ref 8.5–10.1)
CHLORIDE SERPL-SCNC: 105 MMOL/L — SIGNIFICANT CHANGE UP (ref 98–110)
CO2 SERPL-SCNC: 25 MMOL/L — SIGNIFICANT CHANGE UP (ref 17–32)
CREAT SERPL-MCNC: 0.6 MG/DL — LOW (ref 0.7–1.5)
EOSINOPHIL # BLD AUTO: 0.08 K/UL — SIGNIFICANT CHANGE UP (ref 0–0.7)
EOSINOPHIL NFR BLD AUTO: 0.9 % — SIGNIFICANT CHANGE UP (ref 0–8)
GLUCOSE SERPL-MCNC: 90 MG/DL — SIGNIFICANT CHANGE UP (ref 70–99)
HCT VFR BLD CALC: 37 % — SIGNIFICANT CHANGE UP (ref 37–47)
HGB BLD-MCNC: 12.1 G/DL — SIGNIFICANT CHANGE UP (ref 12–16)
IMM GRANULOCYTES NFR BLD AUTO: 0.3 % — SIGNIFICANT CHANGE UP (ref 0.1–0.3)
LYMPHOCYTES # BLD AUTO: 1.42 K/UL — SIGNIFICANT CHANGE UP (ref 1.2–3.4)
LYMPHOCYTES # BLD AUTO: 15.8 % — LOW (ref 20.5–51.1)
MAGNESIUM SERPL-MCNC: 2 MG/DL — SIGNIFICANT CHANGE UP (ref 1.8–2.4)
MCHC RBC-ENTMCNC: 30.3 PG — SIGNIFICANT CHANGE UP (ref 27–31)
MCHC RBC-ENTMCNC: 32.7 G/DL — SIGNIFICANT CHANGE UP (ref 32–37)
MCV RBC AUTO: 92.7 FL — SIGNIFICANT CHANGE UP (ref 81–99)
MONOCYTES # BLD AUTO: 0.88 K/UL — HIGH (ref 0.1–0.6)
MONOCYTES NFR BLD AUTO: 9.8 % — HIGH (ref 1.7–9.3)
NEUTROPHILS # BLD AUTO: 6.57 K/UL — HIGH (ref 1.4–6.5)
NEUTROPHILS NFR BLD AUTO: 72.9 % — SIGNIFICANT CHANGE UP (ref 42.2–75.2)
NRBC # BLD: 0 /100 WBCS — SIGNIFICANT CHANGE UP (ref 0–0)
PHOSPHATE SERPL-MCNC: 2.1 MG/DL — SIGNIFICANT CHANGE UP (ref 2.1–4.9)
PLATELET # BLD AUTO: 171 K/UL — SIGNIFICANT CHANGE UP (ref 130–400)
POTASSIUM SERPL-MCNC: 4.4 MMOL/L — SIGNIFICANT CHANGE UP (ref 3.5–5)
POTASSIUM SERPL-SCNC: 4.4 MMOL/L — SIGNIFICANT CHANGE UP (ref 3.5–5)
RBC # BLD: 3.99 M/UL — LOW (ref 4.2–5.4)
RBC # FLD: 12.7 % — SIGNIFICANT CHANGE UP (ref 11.5–14.5)
SODIUM SERPL-SCNC: 140 MMOL/L — SIGNIFICANT CHANGE UP (ref 135–146)
WBC # BLD: 9.01 K/UL — SIGNIFICANT CHANGE UP (ref 4.8–10.8)
WBC # FLD AUTO: 9.01 K/UL — SIGNIFICANT CHANGE UP (ref 4.8–10.8)

## 2019-12-21 PROCEDURE — 99232 SBSQ HOSP IP/OBS MODERATE 35: CPT | Mod: 25

## 2019-12-21 PROCEDURE — 16020 DRESS/DEBRID P-THICK BURN S: CPT

## 2019-12-21 PROCEDURE — 95819 EEG AWAKE AND ASLEEP: CPT | Mod: 26

## 2019-12-21 RX ADMIN — AMPICILLIN SODIUM AND SULBACTAM SODIUM 200 GRAM(S): 250; 125 INJECTION, POWDER, FOR SUSPENSION INTRAMUSCULAR; INTRAVENOUS at 06:14

## 2019-12-21 RX ADMIN — FELBAMATE 600 MILLIGRAM(S): 600 TABLET ORAL at 21:09

## 2019-12-21 RX ADMIN — ENOXAPARIN SODIUM 40 MILLIGRAM(S): 100 INJECTION SUBCUTANEOUS at 12:53

## 2019-12-21 RX ADMIN — Medication 1 APPLICATION(S): at 21:18

## 2019-12-21 RX ADMIN — Medication 15 MILLIGRAM(S): at 04:09

## 2019-12-21 RX ADMIN — AMPICILLIN SODIUM AND SULBACTAM SODIUM 200 GRAM(S): 250; 125 INJECTION, POWDER, FOR SUSPENSION INTRAMUSCULAR; INTRAVENOUS at 12:54

## 2019-12-21 RX ADMIN — MIDAZOLAM HYDROCHLORIDE 1 MILLIGRAM(S): 1 INJECTION, SOLUTION INTRAMUSCULAR; INTRAVENOUS at 09:40

## 2019-12-21 RX ADMIN — OXYCODONE AND ACETAMINOPHEN 2 TABLET(S): 5; 325 TABLET ORAL at 14:57

## 2019-12-21 RX ADMIN — BACITRACIN AND POLYMYXIN B SULFATE 1 APPLICATION(S): 500; 10000 OINTMENT TOPICAL at 21:19

## 2019-12-21 RX ADMIN — MORPHINE SULFATE 2 MILLIGRAM(S): 50 CAPSULE, EXTENDED RELEASE ORAL at 09:41

## 2019-12-21 RX ADMIN — Medication 1 APPLICATION(S): at 17:57

## 2019-12-21 RX ADMIN — MORPHINE SULFATE 2 MILLIGRAM(S): 50 CAPSULE, EXTENDED RELEASE ORAL at 10:00

## 2019-12-21 RX ADMIN — Medication 1 APPLICATION(S): at 09:41

## 2019-12-21 RX ADMIN — OXYCODONE AND ACETAMINOPHEN 2 TABLET(S): 5; 325 TABLET ORAL at 15:30

## 2019-12-21 RX ADMIN — Medication 1 DROP(S): at 13:14

## 2019-12-21 RX ADMIN — Medication 1 DROP(S): at 21:09

## 2019-12-21 RX ADMIN — Medication 15 MILLIGRAM(S): at 13:13

## 2019-12-21 RX ADMIN — LEVETIRACETAM 500 MILLIGRAM(S): 250 TABLET, FILM COATED ORAL at 17:57

## 2019-12-21 RX ADMIN — Medication 1 DROP(S): at 17:57

## 2019-12-21 RX ADMIN — LEVETIRACETAM 500 MILLIGRAM(S): 250 TABLET, FILM COATED ORAL at 06:15

## 2019-12-21 RX ADMIN — AMPICILLIN SODIUM AND SULBACTAM SODIUM 200 GRAM(S): 250; 125 INJECTION, POWDER, FOR SUSPENSION INTRAMUSCULAR; INTRAVENOUS at 23:00

## 2019-12-21 RX ADMIN — NORETHINDRONE AND ETHINYL ESTRADIOL 1 TABLET(S): KIT at 21:20

## 2019-12-21 RX ADMIN — Medication 1 TABLET(S): at 12:53

## 2019-12-21 RX ADMIN — Medication 1 APPLICATION(S): at 06:15

## 2019-12-21 RX ADMIN — FELBAMATE 600 MILLIGRAM(S): 600 TABLET ORAL at 06:15

## 2019-12-21 RX ADMIN — FELBAMATE 600 MILLIGRAM(S): 600 TABLET ORAL at 13:14

## 2019-12-21 RX ADMIN — Medication 1 DROP(S): at 06:21

## 2019-12-21 RX ADMIN — Medication 1 DROP(S): at 09:41

## 2019-12-21 RX ADMIN — Medication 15 MILLIGRAM(S): at 03:51

## 2019-12-21 RX ADMIN — AMPICILLIN SODIUM AND SULBACTAM SODIUM 200 GRAM(S): 250; 125 INJECTION, POWDER, FOR SUSPENSION INTRAMUSCULAR; INTRAVENOUS at 17:57

## 2019-12-21 RX ADMIN — MORPHINE SULFATE 2 MILLIGRAM(S): 50 CAPSULE, EXTENDED RELEASE ORAL at 21:25

## 2019-12-21 RX ADMIN — Medication 15 MILLIGRAM(S): at 13:45

## 2019-12-21 RX ADMIN — BACITRACIN AND POLYMYXIN B SULFATE 1 APPLICATION(S): 500; 10000 OINTMENT TOPICAL at 06:21

## 2019-12-21 RX ADMIN — MIDAZOLAM HYDROCHLORIDE 1 MILLIGRAM(S): 1 INJECTION, SOLUTION INTRAMUSCULAR; INTRAVENOUS at 21:08

## 2019-12-21 RX ADMIN — MORPHINE SULFATE 2 MILLIGRAM(S): 50 CAPSULE, EXTENDED RELEASE ORAL at 21:08
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
## 2019-12-22 LAB
ANION GAP SERPL CALC-SCNC: 14 MMOL/L — SIGNIFICANT CHANGE UP (ref 7–14)
BASOPHILS # BLD AUTO: 0.03 K/UL — SIGNIFICANT CHANGE UP (ref 0–0.2)
BASOPHILS NFR BLD AUTO: 0.4 % — SIGNIFICANT CHANGE UP (ref 0–1)
BLD GP AB SCN SERPL QL: SIGNIFICANT CHANGE UP
BUN SERPL-MCNC: 6 MG/DL — LOW (ref 10–20)
CALCIUM SERPL-MCNC: 8.4 MG/DL — LOW (ref 8.5–10.1)
CHLORIDE SERPL-SCNC: 106 MMOL/L — SIGNIFICANT CHANGE UP (ref 98–110)
CO2 SERPL-SCNC: 21 MMOL/L — SIGNIFICANT CHANGE UP (ref 17–32)
CREAT SERPL-MCNC: 0.5 MG/DL — LOW (ref 0.7–1.5)
EOSINOPHIL # BLD AUTO: 0.11 K/UL — SIGNIFICANT CHANGE UP (ref 0–0.7)
EOSINOPHIL NFR BLD AUTO: 1.4 % — SIGNIFICANT CHANGE UP (ref 0–8)
GLUCOSE SERPL-MCNC: 87 MG/DL — SIGNIFICANT CHANGE UP (ref 70–99)
HCT VFR BLD CALC: 35.5 % — LOW (ref 37–47)
HGB BLD-MCNC: 11.4 G/DL — LOW (ref 12–16)
IMM GRANULOCYTES NFR BLD AUTO: 0.1 % — SIGNIFICANT CHANGE UP (ref 0.1–0.3)
LEVETIRACETAM SERPL-MCNC: <2 MCG/ML — LOW (ref 12–46)
LYMPHOCYTES # BLD AUTO: 1.8 K/UL — SIGNIFICANT CHANGE UP (ref 1.2–3.4)
LYMPHOCYTES # BLD AUTO: 22.8 % — SIGNIFICANT CHANGE UP (ref 20.5–51.1)
MAGNESIUM SERPL-MCNC: 1.8 MG/DL — SIGNIFICANT CHANGE UP (ref 1.8–2.4)
MCHC RBC-ENTMCNC: 29.9 PG — SIGNIFICANT CHANGE UP (ref 27–31)
MCHC RBC-ENTMCNC: 32.1 G/DL — SIGNIFICANT CHANGE UP (ref 32–37)
MCV RBC AUTO: 93.2 FL — SIGNIFICANT CHANGE UP (ref 81–99)
MONOCYTES # BLD AUTO: 0.67 K/UL — HIGH (ref 0.1–0.6)
MONOCYTES NFR BLD AUTO: 8.5 % — SIGNIFICANT CHANGE UP (ref 1.7–9.3)
NEUTROPHILS # BLD AUTO: 5.26 K/UL — SIGNIFICANT CHANGE UP (ref 1.4–6.5)
NEUTROPHILS NFR BLD AUTO: 66.8 % — SIGNIFICANT CHANGE UP (ref 42.2–75.2)
NRBC # BLD: 0 /100 WBCS — SIGNIFICANT CHANGE UP (ref 0–0)
PHOSPHATE SERPL-MCNC: 2.8 MG/DL — SIGNIFICANT CHANGE UP (ref 2.1–4.9)
PLATELET # BLD AUTO: 161 K/UL — SIGNIFICANT CHANGE UP (ref 130–400)
POTASSIUM SERPL-MCNC: 5.1 MMOL/L — HIGH (ref 3.5–5)
POTASSIUM SERPL-SCNC: 5.1 MMOL/L — HIGH (ref 3.5–5)
RBC # BLD: 3.81 M/UL — LOW (ref 4.2–5.4)
RBC # FLD: 12.6 % — SIGNIFICANT CHANGE UP (ref 11.5–14.5)
SODIUM SERPL-SCNC: 141 MMOL/L — SIGNIFICANT CHANGE UP (ref 135–146)
WBC # BLD: 7.88 K/UL — SIGNIFICANT CHANGE UP (ref 4.8–10.8)
WBC # FLD AUTO: 7.88 K/UL — SIGNIFICANT CHANGE UP (ref 4.8–10.8)

## 2019-12-22 PROCEDURE — 71045 X-RAY EXAM CHEST 1 VIEW: CPT | Mod: 26

## 2019-12-22 PROCEDURE — 16020 DRESS/DEBRID P-THICK BURN S: CPT

## 2019-12-22 PROCEDURE — 99232 SBSQ HOSP IP/OBS MODERATE 35: CPT | Mod: 25

## 2019-12-22 RX ORDER — CEFAZOLIN SODIUM 1 G
2000 VIAL (EA) INJECTION EVERY 8 HOURS
Refills: 0 | Status: DISCONTINUED | OUTPATIENT
Start: 2019-12-22 | End: 2019-12-23

## 2019-12-22 RX ORDER — MAGNESIUM SULFATE 500 MG/ML
2 VIAL (ML) INJECTION ONCE
Refills: 0 | Status: COMPLETED | OUTPATIENT
Start: 2019-12-22 | End: 2019-12-22

## 2019-12-22 RX ORDER — LEVETIRACETAM 250 MG/1
1000 TABLET, FILM COATED ORAL ONCE
Refills: 0 | Status: COMPLETED | OUTPATIENT
Start: 2019-12-22 | End: 2019-12-22

## 2019-12-22 RX ADMIN — OXYCODONE AND ACETAMINOPHEN 2 TABLET(S): 5; 325 TABLET ORAL at 18:30

## 2019-12-22 RX ADMIN — MIDAZOLAM HYDROCHLORIDE 1 MILLIGRAM(S): 1 INJECTION, SOLUTION INTRAMUSCULAR; INTRAVENOUS at 09:52

## 2019-12-22 RX ADMIN — NORETHINDRONE AND ETHINYL ESTRADIOL 1 TABLET(S): KIT at 21:14

## 2019-12-22 RX ADMIN — Medication 1 APPLICATION(S): at 17:27

## 2019-12-22 RX ADMIN — Medication 15 MILLIGRAM(S): at 17:45

## 2019-12-22 RX ADMIN — Medication 1 TABLET(S): at 12:36

## 2019-12-22 RX ADMIN — OXYCODONE AND ACETAMINOPHEN 2 TABLET(S): 5; 325 TABLET ORAL at 10:05

## 2019-12-22 RX ADMIN — Medication 1 APPLICATION(S): at 09:40

## 2019-12-22 RX ADMIN — Medication 1 DROP(S): at 01:11

## 2019-12-22 RX ADMIN — OXYCODONE AND ACETAMINOPHEN 2 TABLET(S): 5; 325 TABLET ORAL at 23:50

## 2019-12-22 RX ADMIN — Medication 1 DROP(S): at 05:22

## 2019-12-22 RX ADMIN — OXYCODONE AND ACETAMINOPHEN 2 TABLET(S): 5; 325 TABLET ORAL at 09:39

## 2019-12-22 RX ADMIN — LEVETIRACETAM 500 MILLIGRAM(S): 250 TABLET, FILM COATED ORAL at 05:22

## 2019-12-22 RX ADMIN — OXYCODONE AND ACETAMINOPHEN 2 TABLET(S): 5; 325 TABLET ORAL at 22:44

## 2019-12-22 RX ADMIN — Medication 100 MILLIGRAM(S): at 21:10

## 2019-12-22 RX ADMIN — OXYCODONE AND ACETAMINOPHEN 2 TABLET(S): 5; 325 TABLET ORAL at 00:08

## 2019-12-22 RX ADMIN — Medication 1 DROP(S): at 09:40

## 2019-12-22 RX ADMIN — MORPHINE SULFATE 2 MILLIGRAM(S): 50 CAPSULE, EXTENDED RELEASE ORAL at 09:52

## 2019-12-22 RX ADMIN — Medication 1 APPLICATION(S): at 09:41

## 2019-12-22 RX ADMIN — Medication 1 APPLICATION(S): at 21:11

## 2019-12-22 RX ADMIN — FELBAMATE 600 MILLIGRAM(S): 600 TABLET ORAL at 21:08

## 2019-12-22 RX ADMIN — Medication 50 GRAM(S): at 22:44

## 2019-12-22 RX ADMIN — ENOXAPARIN SODIUM 40 MILLIGRAM(S): 100 INJECTION SUBCUTANEOUS at 12:36

## 2019-12-22 RX ADMIN — BACITRACIN AND POLYMYXIN B SULFATE 1 APPLICATION(S): 500; 10000 OINTMENT TOPICAL at 05:24

## 2019-12-22 RX ADMIN — Medication 15 MILLIGRAM(S): at 17:26

## 2019-12-22 RX ADMIN — Medication 1 DROP(S): at 17:26

## 2019-12-22 RX ADMIN — LEVETIRACETAM 1000 MILLIGRAM(S): 250 TABLET, FILM COATED ORAL at 15:14

## 2019-12-22 RX ADMIN — Medication 1 APPLICATION(S): at 05:22

## 2019-12-22 RX ADMIN — OXYCODONE AND ACETAMINOPHEN 2 TABLET(S): 5; 325 TABLET ORAL at 18:07

## 2019-12-22 RX ADMIN — FELBAMATE 600 MILLIGRAM(S): 600 TABLET ORAL at 13:53

## 2019-12-22 RX ADMIN — BACITRACIN AND POLYMYXIN B SULFATE 1 APPLICATION(S): 500; 10000 OINTMENT TOPICAL at 21:09

## 2019-12-22 RX ADMIN — FELBAMATE 600 MILLIGRAM(S): 600 TABLET ORAL at 05:22

## 2019-12-22 RX ADMIN — MORPHINE SULFATE 2 MILLIGRAM(S): 50 CAPSULE, EXTENDED RELEASE ORAL at 10:10

## 2019-12-22 RX ADMIN — AMPICILLIN SODIUM AND SULBACTAM SODIUM 200 GRAM(S): 250; 125 INJECTION, POWDER, FOR SUSPENSION INTRAMUSCULAR; INTRAVENOUS at 12:35

## 2019-12-22 RX ADMIN — Medication 1 DROP(S): at 13:53

## 2019-12-22 RX ADMIN — Medication 1 DROP(S): at 21:08

## 2019-12-22 RX ADMIN — LEVETIRACETAM 500 MILLIGRAM(S): 250 TABLET, FILM COATED ORAL at 17:27

## 2019-12-22 RX ADMIN — AMPICILLIN SODIUM AND SULBACTAM SODIUM 200 GRAM(S): 250; 125 INJECTION, POWDER, FOR SUSPENSION INTRAMUSCULAR; INTRAVENOUS at 05:24

## 2019-12-22 RX ADMIN — Medication 1 APPLICATION(S): at 21:12
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
## 2019-12-23 ENCOUNTER — RESULT REVIEW (OUTPATIENT)
Age: 24
End: 2019-12-23

## 2019-12-23 LAB
ANION GAP SERPL CALC-SCNC: 10 MMOL/L — SIGNIFICANT CHANGE UP (ref 7–14)
BASOPHILS # BLD AUTO: 0.02 K/UL — SIGNIFICANT CHANGE UP (ref 0–0.2)
BASOPHILS NFR BLD AUTO: 0.2 % — SIGNIFICANT CHANGE UP (ref 0–1)
BLD GP AB SCN SERPL QL: SIGNIFICANT CHANGE UP
BUN SERPL-MCNC: 5 MG/DL — LOW (ref 10–20)
CALCIUM SERPL-MCNC: 7.9 MG/DL — LOW (ref 8.5–10.1)
CHLORIDE SERPL-SCNC: 102 MMOL/L — SIGNIFICANT CHANGE UP (ref 98–110)
CO2 SERPL-SCNC: 24 MMOL/L — SIGNIFICANT CHANGE UP (ref 17–32)
CREAT SERPL-MCNC: 0.5 MG/DL — LOW (ref 0.7–1.5)
EOSINOPHIL # BLD AUTO: 0 K/UL — SIGNIFICANT CHANGE UP (ref 0–0.7)
EOSINOPHIL NFR BLD AUTO: 0 % — SIGNIFICANT CHANGE UP (ref 0–8)
GLUCOSE SERPL-MCNC: 116 MG/DL — HIGH (ref 70–99)
HCG UR QL: NEGATIVE — SIGNIFICANT CHANGE UP
HCT VFR BLD CALC: 29 % — LOW (ref 37–47)
HGB BLD-MCNC: 9.6 G/DL — LOW (ref 12–16)
IMM GRANULOCYTES NFR BLD AUTO: 0.4 % — HIGH (ref 0.1–0.3)
LYMPHOCYTES # BLD AUTO: 0.87 K/UL — LOW (ref 1.2–3.4)
LYMPHOCYTES # BLD AUTO: 9.7 % — LOW (ref 20.5–51.1)
MAGNESIUM SERPL-MCNC: 1.8 MG/DL — SIGNIFICANT CHANGE UP (ref 1.8–2.4)
MCHC RBC-ENTMCNC: 30.5 PG — SIGNIFICANT CHANGE UP (ref 27–31)
MCHC RBC-ENTMCNC: 33.1 G/DL — SIGNIFICANT CHANGE UP (ref 32–37)
MCV RBC AUTO: 92.1 FL — SIGNIFICANT CHANGE UP (ref 81–99)
MONOCYTES # BLD AUTO: 0.58 K/UL — SIGNIFICANT CHANGE UP (ref 0.1–0.6)
MONOCYTES NFR BLD AUTO: 6.5 % — SIGNIFICANT CHANGE UP (ref 1.7–9.3)
NEUTROPHILS # BLD AUTO: 7.43 K/UL — HIGH (ref 1.4–6.5)
NEUTROPHILS NFR BLD AUTO: 83.2 % — HIGH (ref 42.2–75.2)
NRBC # BLD: 0 /100 WBCS — SIGNIFICANT CHANGE UP (ref 0–0)
PHOSPHATE SERPL-MCNC: 3 MG/DL — SIGNIFICANT CHANGE UP (ref 2.1–4.9)
PLATELET # BLD AUTO: 212 K/UL — SIGNIFICANT CHANGE UP (ref 130–400)
POTASSIUM SERPL-MCNC: 4.5 MMOL/L — SIGNIFICANT CHANGE UP (ref 3.5–5)
POTASSIUM SERPL-SCNC: 4.5 MMOL/L — SIGNIFICANT CHANGE UP (ref 3.5–5)
RBC # BLD: 3.15 M/UL — LOW (ref 4.2–5.4)
RBC # FLD: 12.3 % — SIGNIFICANT CHANGE UP (ref 11.5–14.5)
SODIUM SERPL-SCNC: 136 MMOL/L — SIGNIFICANT CHANGE UP (ref 135–146)
WBC # BLD: 8.94 K/UL — SIGNIFICANT CHANGE UP (ref 4.8–10.8)
WBC # FLD AUTO: 8.94 K/UL — SIGNIFICANT CHANGE UP (ref 4.8–10.8)

## 2019-12-23 PROCEDURE — 88304 TISSUE EXAM BY PATHOLOGIST: CPT | Mod: 26

## 2019-12-23 PROCEDURE — 93010 ELECTROCARDIOGRAM REPORT: CPT

## 2019-12-23 PROCEDURE — 88311 DECALCIFY TISSUE: CPT | Mod: 26

## 2019-12-23 RX ORDER — ACETAMINOPHEN 500 MG
650 TABLET ORAL EVERY 6 HOURS
Refills: 0 | Status: DISCONTINUED | OUTPATIENT
Start: 2019-12-23 | End: 2019-12-28

## 2019-12-23 RX ORDER — NORETHINDRONE AND ETHINYL ESTRADIOL 0.4-0.035
1 KIT ORAL DAILY
Refills: 0 | Status: DISCONTINUED | OUTPATIENT
Start: 2019-12-23 | End: 2019-12-28

## 2019-12-23 RX ORDER — HYDROMORPHONE HYDROCHLORIDE 2 MG/ML
0.5 INJECTION INTRAMUSCULAR; INTRAVENOUS; SUBCUTANEOUS
Refills: 0 | Status: DISCONTINUED | OUTPATIENT
Start: 2019-12-23 | End: 2019-12-23

## 2019-12-23 RX ORDER — BACITRACIN AND POLYMYXIN B SULFATE 500; 10000 [USP'U]/G; [USP'U]/G
1 OINTMENT TOPICAL
Refills: 0 | Status: DISCONTINUED | OUTPATIENT
Start: 2019-12-23 | End: 2019-12-28

## 2019-12-23 RX ORDER — HYDROMORPHONE HYDROCHLORIDE 2 MG/ML
0.5 INJECTION INTRAMUSCULAR; INTRAVENOUS; SUBCUTANEOUS ONCE
Refills: 0 | Status: DISCONTINUED | OUTPATIENT
Start: 2019-12-23 | End: 2019-12-23

## 2019-12-23 RX ORDER — SENNA PLUS 8.6 MG/1
2 TABLET ORAL AT BEDTIME
Refills: 0 | Status: DISCONTINUED | OUTPATIENT
Start: 2019-12-23 | End: 2019-12-28

## 2019-12-23 RX ORDER — MORPHINE SULFATE 50 MG/1
2 CAPSULE, EXTENDED RELEASE ORAL
Refills: 0 | Status: DISCONTINUED | OUTPATIENT
Start: 2019-12-23 | End: 2019-12-28

## 2019-12-23 RX ORDER — ENOXAPARIN SODIUM 100 MG/ML
40 INJECTION SUBCUTANEOUS DAILY
Refills: 0 | Status: DISCONTINUED | OUTPATIENT
Start: 2019-12-23 | End: 2019-12-28

## 2019-12-23 RX ORDER — FELBAMATE 600 MG/1
600 TABLET ORAL THREE TIMES A DAY
Refills: 0 | Status: DISCONTINUED | OUTPATIENT
Start: 2019-12-23 | End: 2019-12-28

## 2019-12-23 RX ORDER — SODIUM CHLORIDE 9 MG/ML
1000 INJECTION, SOLUTION INTRAVENOUS
Refills: 0 | Status: DISCONTINUED | OUTPATIENT
Start: 2019-12-23 | End: 2019-12-23

## 2019-12-23 RX ORDER — OXYCODONE AND ACETAMINOPHEN 5; 325 MG/1; MG/1
2 TABLET ORAL ONCE
Refills: 0 | Status: DISCONTINUED | OUTPATIENT
Start: 2019-12-23 | End: 2019-12-23

## 2019-12-23 RX ORDER — ONDANSETRON 8 MG/1
4 TABLET, FILM COATED ORAL ONCE
Refills: 0 | Status: DISCONTINUED | OUTPATIENT
Start: 2019-12-23 | End: 2019-12-23

## 2019-12-23 RX ORDER — KETOROLAC TROMETHAMINE 30 MG/ML
15 SYRINGE (ML) INJECTION ONCE
Refills: 0 | Status: DISCONTINUED | OUTPATIENT
Start: 2019-12-23 | End: 2019-12-23

## 2019-12-23 RX ORDER — OXYMETAZOLINE HYDROCHLORIDE 0.5 MG/ML
1 SPRAY NASAL
Refills: 0 | Status: DISCONTINUED | OUTPATIENT
Start: 2019-12-23 | End: 2019-12-28

## 2019-12-23 RX ORDER — LEVETIRACETAM 250 MG/1
500 TABLET, FILM COATED ORAL EVERY 12 HOURS
Refills: 0 | Status: DISCONTINUED | OUTPATIENT
Start: 2019-12-23 | End: 2019-12-28

## 2019-12-23 RX ORDER — HYDROMORPHONE HYDROCHLORIDE 2 MG/ML
1 INJECTION INTRAMUSCULAR; INTRAVENOUS; SUBCUTANEOUS
Refills: 0 | Status: DISCONTINUED | OUTPATIENT
Start: 2019-12-23 | End: 2019-12-23

## 2019-12-23 RX ORDER — OXYCODONE AND ACETAMINOPHEN 5; 325 MG/1; MG/1
2 TABLET ORAL EVERY 4 HOURS
Refills: 0 | Status: DISCONTINUED | OUTPATIENT
Start: 2019-12-23 | End: 2019-12-28

## 2019-12-23 RX ORDER — MIDAZOLAM HYDROCHLORIDE 1 MG/ML
1 INJECTION, SOLUTION INTRAMUSCULAR; INTRAVENOUS
Refills: 0 | Status: DISCONTINUED | OUTPATIENT
Start: 2019-12-23 | End: 2019-12-28

## 2019-12-23 RX ORDER — ONDANSETRON 8 MG/1
4 TABLET, FILM COATED ORAL EVERY 6 HOURS
Refills: 0 | Status: DISCONTINUED | OUTPATIENT
Start: 2019-12-23 | End: 2019-12-28

## 2019-12-23 RX ORDER — MORPHINE SULFATE 50 MG/1
2 CAPSULE, EXTENDED RELEASE ORAL EVERY 4 HOURS
Refills: 0 | Status: DISCONTINUED | OUTPATIENT
Start: 2019-12-23 | End: 2019-12-28

## 2019-12-23 RX ORDER — BACITRACIN ZINC 500 UNIT/G
1 OINTMENT IN PACKET (EA) TOPICAL
Refills: 0 | Status: DISCONTINUED | OUTPATIENT
Start: 2019-12-23 | End: 2019-12-28

## 2019-12-23 RX ORDER — CEFAZOLIN SODIUM 1 G
2000 VIAL (EA) INJECTION EVERY 8 HOURS
Refills: 0 | Status: DISCONTINUED | OUTPATIENT
Start: 2019-12-23 | End: 2019-12-28

## 2019-12-23 RX ORDER — SODIUM CHLORIDE 9 MG/ML
1000 INJECTION INTRAMUSCULAR; INTRAVENOUS; SUBCUTANEOUS
Refills: 0 | Status: DISCONTINUED | OUTPATIENT
Start: 2019-12-23 | End: 2019-12-25

## 2019-12-23 RX ORDER — OXYMETAZOLINE HYDROCHLORIDE 0.5 MG/ML
1 SPRAY NASAL
Refills: 0 | Status: DISCONTINUED | OUTPATIENT
Start: 2019-12-23 | End: 2019-12-23

## 2019-12-23 RX ADMIN — Medication 1 APPLICATION(S): at 05:10

## 2019-12-23 RX ADMIN — LEVETIRACETAM 500 MILLIGRAM(S): 250 TABLET, FILM COATED ORAL at 05:30

## 2019-12-23 RX ADMIN — SODIUM CHLORIDE 100 MILLILITER(S): 9 INJECTION, SOLUTION INTRAVENOUS at 17:35

## 2019-12-23 RX ADMIN — OXYCODONE AND ACETAMINOPHEN 2 TABLET(S): 5; 325 TABLET ORAL at 23:08

## 2019-12-23 RX ADMIN — NORETHINDRONE AND ETHINYL ESTRADIOL 1 TABLET(S): KIT at 21:34

## 2019-12-23 RX ADMIN — MORPHINE SULFATE 2 MILLIGRAM(S): 50 CAPSULE, EXTENDED RELEASE ORAL at 18:53

## 2019-12-23 RX ADMIN — Medication 100 MILLIGRAM(S): at 05:13

## 2019-12-23 RX ADMIN — BACITRACIN AND POLYMYXIN B SULFATE 1 APPLICATION(S): 500; 10000 OINTMENT TOPICAL at 21:29

## 2019-12-23 RX ADMIN — HYDROMORPHONE HYDROCHLORIDE 0.5 MILLIGRAM(S): 2 INJECTION INTRAMUSCULAR; INTRAVENOUS; SUBCUTANEOUS at 17:32

## 2019-12-23 RX ADMIN — Medication 1 APPLICATION(S): at 21:25

## 2019-12-23 RX ADMIN — Medication 15 MILLIGRAM(S): at 19:40

## 2019-12-23 RX ADMIN — Medication 1 DROP(S): at 05:11

## 2019-12-23 RX ADMIN — MORPHINE SULFATE 2 MILLIGRAM(S): 50 CAPSULE, EXTENDED RELEASE ORAL at 22:38

## 2019-12-23 RX ADMIN — MORPHINE SULFATE 2 MILLIGRAM(S): 50 CAPSULE, EXTENDED RELEASE ORAL at 21:59

## 2019-12-23 RX ADMIN — HYDROMORPHONE HYDROCHLORIDE 0.5 MILLIGRAM(S): 2 INJECTION INTRAMUSCULAR; INTRAVENOUS; SUBCUTANEOUS at 16:50

## 2019-12-23 RX ADMIN — FELBAMATE 600 MILLIGRAM(S): 600 TABLET ORAL at 21:45

## 2019-12-23 RX ADMIN — HYDROMORPHONE HYDROCHLORIDE 0.5 MILLIGRAM(S): 2 INJECTION INTRAMUSCULAR; INTRAVENOUS; SUBCUTANEOUS at 19:05

## 2019-12-23 RX ADMIN — ENOXAPARIN SODIUM 40 MILLIGRAM(S): 100 INJECTION SUBCUTANEOUS at 18:35

## 2019-12-23 RX ADMIN — HYDROMORPHONE HYDROCHLORIDE 1 MILLIGRAM(S): 2 INJECTION INTRAMUSCULAR; INTRAVENOUS; SUBCUTANEOUS at 17:01

## 2019-12-23 RX ADMIN — Medication 1 DROP(S): at 09:41

## 2019-12-23 RX ADMIN — Medication 1 DROP(S): at 21:25

## 2019-12-23 RX ADMIN — LEVETIRACETAM 500 MILLIGRAM(S): 250 TABLET, FILM COATED ORAL at 18:35

## 2019-12-23 RX ADMIN — HYDROMORPHONE HYDROCHLORIDE 0.5 MILLIGRAM(S): 2 INJECTION INTRAMUSCULAR; INTRAVENOUS; SUBCUTANEOUS at 19:27

## 2019-12-23 RX ADMIN — MORPHINE SULFATE 2 MILLIGRAM(S): 50 CAPSULE, EXTENDED RELEASE ORAL at 18:35

## 2019-12-23 RX ADMIN — Medication 1 DROP(S): at 01:50

## 2019-12-23 RX ADMIN — Medication 100 MILLIGRAM(S): at 21:30

## 2019-12-23 RX ADMIN — FELBAMATE 600 MILLIGRAM(S): 600 TABLET ORAL at 05:11

## 2019-12-23 RX ADMIN — BACITRACIN AND POLYMYXIN B SULFATE 1 APPLICATION(S): 500; 10000 OINTMENT TOPICAL at 05:11

## 2019-12-23 RX ADMIN — Medication 15 MILLIGRAM(S): at 20:00

## 2019-12-23 RX ADMIN — OXYMETAZOLINE HYDROCHLORIDE 1 SPRAY(S): 0.5 SPRAY NASAL at 09:38

## 2019-12-23 NOTE — PRE-OP CHECKLIST - SELECT TESTS ORDERED
CBC/BMP/Type and Screen/CMP/Type and Cross/PT/PTT/INR/CXR BMP/Type and Screen/CMP/CBC/PT/PTT/INR/Type and Cross/CXR/EKG
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
## 2019-12-24 DIAGNOSIS — T22.30XA BURN OF THIRD DEGREE OF SHOULDER AND UPPER LIMB, EXCEPT WRIST AND HAND, UNSPECIFIED SITE, INITIAL ENCOUNTER: ICD-10-CM

## 2019-12-24 LAB
ANION GAP SERPL CALC-SCNC: 12 MMOL/L — SIGNIFICANT CHANGE UP (ref 7–14)
BASOPHILS # BLD AUTO: 0.03 K/UL — SIGNIFICANT CHANGE UP (ref 0–0.2)
BASOPHILS NFR BLD AUTO: 0.4 % — SIGNIFICANT CHANGE UP (ref 0–1)
BUN SERPL-MCNC: 4 MG/DL — LOW (ref 10–20)
CALCIUM SERPL-MCNC: 8.5 MG/DL — SIGNIFICANT CHANGE UP (ref 8.5–10.1)
CHLORIDE SERPL-SCNC: 102 MMOL/L — SIGNIFICANT CHANGE UP (ref 98–110)
CO2 SERPL-SCNC: 24 MMOL/L — SIGNIFICANT CHANGE UP (ref 17–32)
CREAT SERPL-MCNC: 0.5 MG/DL — LOW (ref 0.7–1.5)
EOSINOPHIL # BLD AUTO: 0.21 K/UL — SIGNIFICANT CHANGE UP (ref 0–0.7)
EOSINOPHIL NFR BLD AUTO: 2.7 % — SIGNIFICANT CHANGE UP (ref 0–8)
GLUCOSE SERPL-MCNC: 85 MG/DL — SIGNIFICANT CHANGE UP (ref 70–99)
HCT VFR BLD CALC: 31.8 % — LOW (ref 37–47)
HGB BLD-MCNC: 10.4 G/DL — LOW (ref 12–16)
IMM GRANULOCYTES NFR BLD AUTO: 0.4 % — HIGH (ref 0.1–0.3)
LYMPHOCYTES # BLD AUTO: 2.48 K/UL — SIGNIFICANT CHANGE UP (ref 1.2–3.4)
LYMPHOCYTES # BLD AUTO: 31.5 % — SIGNIFICANT CHANGE UP (ref 20.5–51.1)
MAGNESIUM SERPL-MCNC: 2.1 MG/DL — SIGNIFICANT CHANGE UP (ref 1.8–2.4)
MCHC RBC-ENTMCNC: 30.2 PG — SIGNIFICANT CHANGE UP (ref 27–31)
MCHC RBC-ENTMCNC: 32.7 G/DL — SIGNIFICANT CHANGE UP (ref 32–37)
MCV RBC AUTO: 92.4 FL — SIGNIFICANT CHANGE UP (ref 81–99)
MONOCYTES # BLD AUTO: 0.81 K/UL — HIGH (ref 0.1–0.6)
MONOCYTES NFR BLD AUTO: 10.3 % — HIGH (ref 1.7–9.3)
NEUTROPHILS # BLD AUTO: 4.31 K/UL — SIGNIFICANT CHANGE UP (ref 1.4–6.5)
NEUTROPHILS NFR BLD AUTO: 54.7 % — SIGNIFICANT CHANGE UP (ref 42.2–75.2)
NRBC # BLD: 0 /100 WBCS — SIGNIFICANT CHANGE UP (ref 0–0)
PHOSPHATE SERPL-MCNC: 3 MG/DL — SIGNIFICANT CHANGE UP (ref 2.1–4.9)
PLATELET # BLD AUTO: 300 K/UL — SIGNIFICANT CHANGE UP (ref 130–400)
POTASSIUM SERPL-MCNC: 4.7 MMOL/L — SIGNIFICANT CHANGE UP (ref 3.5–5)
POTASSIUM SERPL-SCNC: 4.7 MMOL/L — SIGNIFICANT CHANGE UP (ref 3.5–5)
RBC # BLD: 3.44 M/UL — LOW (ref 4.2–5.4)
RBC # FLD: 12.6 % — SIGNIFICANT CHANGE UP (ref 11.5–14.5)
SODIUM SERPL-SCNC: 138 MMOL/L — SIGNIFICANT CHANGE UP (ref 135–146)
WBC # BLD: 7.87 K/UL — SIGNIFICANT CHANGE UP (ref 4.8–10.8)
WBC # FLD AUTO: 7.87 K/UL — SIGNIFICANT CHANGE UP (ref 4.8–10.8)

## 2019-12-24 PROCEDURE — 97606 NEG PRS WND THER DME>50 SQCM: CPT | Mod: 59

## 2019-12-24 PROCEDURE — 15101 SPLT AGRFT T/A/L EA ADDL 100: CPT

## 2019-12-24 PROCEDURE — 15002 WOUND PREP TRK/ARM/LEG: CPT

## 2019-12-24 PROCEDURE — 15003 WOUND PREP ADDL 100 CM: CPT

## 2019-12-24 PROCEDURE — 15100 SPLT AGRFT T/A/L 1ST 100SQCM: CPT

## 2019-12-24 PROCEDURE — 99231 SBSQ HOSP IP/OBS SF/LOW 25: CPT | Mod: 25

## 2019-12-24 RX ORDER — MAGNESIUM SULFATE 500 MG/ML
2 VIAL (ML) INJECTION ONCE
Refills: 0 | Status: COMPLETED | OUTPATIENT
Start: 2019-12-24 | End: 2019-12-24

## 2019-12-24 RX ORDER — KETOROLAC TROMETHAMINE 30 MG/ML
15 SYRINGE (ML) INJECTION EVERY 6 HOURS
Refills: 0 | Status: DISCONTINUED | OUTPATIENT
Start: 2019-12-24 | End: 2019-12-26

## 2019-12-24 RX ADMIN — Medication 100 MILLIGRAM(S): at 05:11

## 2019-12-24 RX ADMIN — OXYCODONE AND ACETAMINOPHEN 2 TABLET(S): 5; 325 TABLET ORAL at 05:16

## 2019-12-24 RX ADMIN — MORPHINE SULFATE 2 MILLIGRAM(S): 50 CAPSULE, EXTENDED RELEASE ORAL at 02:30

## 2019-12-24 RX ADMIN — Medication 100 MILLIGRAM(S): at 13:11

## 2019-12-24 RX ADMIN — Medication 1 DROP(S): at 19:01

## 2019-12-24 RX ADMIN — MORPHINE SULFATE 2 MILLIGRAM(S): 50 CAPSULE, EXTENDED RELEASE ORAL at 14:11

## 2019-12-24 RX ADMIN — Medication 1 DROP(S): at 13:29

## 2019-12-24 RX ADMIN — OXYCODONE AND ACETAMINOPHEN 2 TABLET(S): 5; 325 TABLET ORAL at 22:35

## 2019-12-24 RX ADMIN — ENOXAPARIN SODIUM 40 MILLIGRAM(S): 100 INJECTION SUBCUTANEOUS at 13:15

## 2019-12-24 RX ADMIN — MORPHINE SULFATE 2 MILLIGRAM(S): 50 CAPSULE, EXTENDED RELEASE ORAL at 19:55

## 2019-12-24 RX ADMIN — Medication 100 MILLIGRAM(S): at 21:22

## 2019-12-24 RX ADMIN — MORPHINE SULFATE 2 MILLIGRAM(S): 50 CAPSULE, EXTENDED RELEASE ORAL at 09:10

## 2019-12-24 RX ADMIN — MORPHINE SULFATE 2 MILLIGRAM(S): 50 CAPSULE, EXTENDED RELEASE ORAL at 08:49

## 2019-12-24 RX ADMIN — Medication 1 DROP(S): at 02:09

## 2019-12-24 RX ADMIN — Medication 1 DROP(S): at 21:14

## 2019-12-24 RX ADMIN — MORPHINE SULFATE 2 MILLIGRAM(S): 50 CAPSULE, EXTENDED RELEASE ORAL at 10:33

## 2019-12-24 RX ADMIN — FELBAMATE 600 MILLIGRAM(S): 600 TABLET ORAL at 05:10

## 2019-12-24 RX ADMIN — MORPHINE SULFATE 2 MILLIGRAM(S): 50 CAPSULE, EXTENDED RELEASE ORAL at 02:10

## 2019-12-24 RX ADMIN — OXYCODONE AND ACETAMINOPHEN 2 TABLET(S): 5; 325 TABLET ORAL at 00:00

## 2019-12-24 RX ADMIN — Medication 15 MILLIGRAM(S): at 17:30

## 2019-12-24 RX ADMIN — Medication 15 MILLIGRAM(S): at 23:04

## 2019-12-24 RX ADMIN — Medication 1 APPLICATION(S): at 21:16

## 2019-12-24 RX ADMIN — SODIUM CHLORIDE 75 MILLILITER(S): 9 INJECTION INTRAMUSCULAR; INTRAVENOUS; SUBCUTANEOUS at 05:23

## 2019-12-24 RX ADMIN — Medication 15 MILLIGRAM(S): at 16:54

## 2019-12-24 RX ADMIN — NORETHINDRONE AND ETHINYL ESTRADIOL 1 TABLET(S): KIT at 21:20

## 2019-12-24 RX ADMIN — OXYCODONE AND ACETAMINOPHEN 2 TABLET(S): 5; 325 TABLET ORAL at 23:00

## 2019-12-24 RX ADMIN — LEVETIRACETAM 500 MILLIGRAM(S): 250 TABLET, FILM COATED ORAL at 05:10

## 2019-12-24 RX ADMIN — Medication 1 TABLET(S): at 11:21

## 2019-12-24 RX ADMIN — OXYCODONE AND ACETAMINOPHEN 2 TABLET(S): 5; 325 TABLET ORAL at 18:32

## 2019-12-24 RX ADMIN — LEVETIRACETAM 500 MILLIGRAM(S): 250 TABLET, FILM COATED ORAL at 18:34

## 2019-12-24 RX ADMIN — MORPHINE SULFATE 2 MILLIGRAM(S): 50 CAPSULE, EXTENDED RELEASE ORAL at 10:50

## 2019-12-24 RX ADMIN — OXYCODONE AND ACETAMINOPHEN 2 TABLET(S): 5; 325 TABLET ORAL at 06:00

## 2019-12-24 RX ADMIN — Medication 15 MILLIGRAM(S): at 23:44

## 2019-12-24 RX ADMIN — Medication 1 DROP(S): at 11:21

## 2019-12-24 RX ADMIN — OXYCODONE AND ACETAMINOPHEN 2 TABLET(S): 5; 325 TABLET ORAL at 13:16

## 2019-12-24 RX ADMIN — OXYCODONE AND ACETAMINOPHEN 2 TABLET(S): 5; 325 TABLET ORAL at 09:43

## 2019-12-24 RX ADMIN — FELBAMATE 600 MILLIGRAM(S): 600 TABLET ORAL at 13:13

## 2019-12-24 RX ADMIN — OXYCODONE AND ACETAMINOPHEN 2 TABLET(S): 5; 325 TABLET ORAL at 15:00

## 2019-12-24 RX ADMIN — FELBAMATE 600 MILLIGRAM(S): 600 TABLET ORAL at 21:15

## 2019-12-24 RX ADMIN — BACITRACIN AND POLYMYXIN B SULFATE 1 APPLICATION(S): 500; 10000 OINTMENT TOPICAL at 05:24

## 2019-12-24 RX ADMIN — Medication 1 APPLICATION(S): at 09:45

## 2019-12-24 RX ADMIN — Medication 1 APPLICATION(S): at 05:24

## 2019-12-24 RX ADMIN — OXYCODONE AND ACETAMINOPHEN 2 TABLET(S): 5; 325 TABLET ORAL at 19:02

## 2019-12-24 RX ADMIN — MORPHINE SULFATE 2 MILLIGRAM(S): 50 CAPSULE, EXTENDED RELEASE ORAL at 13:19

## 2019-12-24 RX ADMIN — BACITRACIN AND POLYMYXIN B SULFATE 1 APPLICATION(S): 500; 10000 OINTMENT TOPICAL at 21:17

## 2019-12-24 RX ADMIN — OXYCODONE AND ACETAMINOPHEN 2 TABLET(S): 5; 325 TABLET ORAL at 14:15

## 2019-12-24 RX ADMIN — Medication 1 APPLICATION(S): at 21:17

## 2019-12-24 RX ADMIN — Medication 50 GRAM(S): at 06:45

## 2019-12-24 RX ADMIN — Medication 1 DROP(S): at 05:11

## 2019-12-24 NOTE — PROGRESS NOTE ADULT - SUBJECTIVE AND OBJECTIVE BOX
am rounds      INTERVAL HISTORY:  No acute issues overnight. no complaints   POD# 1  s/p  skin graft placement. Repeat Keppra level is pending due to increase dose.     Vital Signs Last 24 Hrs  T(C): 36.9 (24 Dec 2019 07:25), Max: 37.4 (23 Dec 2019 23:27)  T(F): 98.4 (24 Dec 2019 07:25), Max: 99.4 (23 Dec 2019 23:27)  HR: 85 (24 Dec 2019 07:25) (67 - 95)  BP: 124/56 (24 Dec 2019 07:25) (95/51 - 124/56)  BP(mean): 81 (24 Dec 2019 07:25) (81 - 81)  RR: 18 (24 Dec 2019 07:25) (11 - 19)  SpO2: 85% (24 Dec 2019 07:25) (85% - 100%)    I&O's Detail    23 Dec 2019 07:01  -  24 Dec 2019 07:00  --------------------------------------------------------  IN:    IV PiggyBack: 150 mL    sodium chloride 0.9%: 525 mL    sodium chloride 0.9%.: 900 mL  Total IN: 1575 mL    OUT:    Voided: 500 mL  Total OUT: 500 mL    Total NET: 1075 mL            MEDICATIONS  (STANDING):  artificial  tears Solution 1 Drop(s) Both EYES every 4 hours  BACItracin   Ointment 1 Application(s) Topical two times a day  bacitracin/polymyxin B Ointment 1 Application(s) Topical two times a day  ceFAZolin   IVPB 2000 milliGRAM(s) IV Intermittent every 8 hours  enoxaparin Injectable 40 milliGRAM(s) SubCutaneous daily  ethinyl  estradiol-norethindrone (JUNEL FE) 1 Tablet(s) Oral daily  felbamate 600 milliGRAM(s) Oral three times a day  influenza   Vaccine 0.5 milliLiter(s) IntraMuscular once  levETIRAcetam 500 milliGRAM(s) Oral every 12 hours  multivitamin 1 Tablet(s) Oral daily  petrolatum Ophthalmic Ointment 1 Application(s) Right EYE every 12 hours  silver sulfADIAZINE 1% Cream 1 Application(s) Topical two times a day  sodium chloride 0.9%. 1000 milliLiter(s) (75 mL/Hr) IV Continuous <Continuous>    MEDICATIONS  (PRN):  acetaminophen   Tablet .. 650 milliGRAM(s) Oral every 6 hours PRN Temp greater or equal to 38C (100.4F), Mild Pain (1 - 3)  midazolam Injectable 1 milliGRAM(s) IV Push two times a day PRN wound care  morphine  - Injectable 2 milliGRAM(s) IV Push two times a day PRN wound care  morphine  - Injectable 2 milliGRAM(s) IV Push every 4 hours PRN Severe Pain (7 - 10)  ondansetron Injectable 4 milliGRAM(s) IV Push every 6 hours PRN for nausea/vomiting  oxycodone    5 mG/acetaminophen 325 mG 2 Tablet(s) Oral every 4 hours PRN Severe Pain (7 - 10)  oxymetazoline 0.05% Nasal Spray 1 Spray(s) Both Nostrils two times a day PRN Nasal Congestion  senna 2 Tablet(s) Oral at bedtime PRN Constipation    Allergies    No Known Allergies    Intolerances        Lab Results:                        9.6    8.94  )-----------( 212      ( 23 Dec 2019 20:25 )             29.0     12-23    136  |  102  |  5<L>  ----------------------------<  116<H>  4.5   |  24  |  0.5<L>    Ca    7.9<L>      23 Dec 2019 20:25  Phos  3.0     12-23  Mg     1.8     12-23        < from: CT Maxillofacial No Cont (12.18.19 @ 16:25) >    Right frontal and periorbital extracalvarial soft tissue swelling/hematoma. No evidence of underlying fracture. No evidence of globe injury.      < from: CT Head No Cont (12.18.19 @ 12:23) >  No acute intracranial hemorrhage, mass effect, or acute osseous fracture.   Right frontotemporal scalp hematoma.    Levetiracetam Level, Serum (12.19.19 @ 16:36)    Levetiracetam Level, Serum: <2.0:     12/23: pending          PHYSICAL EXAM: I have reviewed current vital signs.  GENERAL: NAD, Well-nourished; Well-developed  CHEST/LUNG: no cardiopulmonary distress   PSYCH: Cooperative; appropriate.  NEUROLOGY: AAO x 3.   SKIN: ***** am rounds      INTERVAL HISTORY:  No acute issues overnight. no complaints   POD# 1  s/p  skin graft placement. Repeat Keppra level is pending due to increase dose.   c/o pain and drainage right thigh donor site   Vital Signs Last 24 Hrs  T(C): 36.9 (24 Dec 2019 07:25), Max: 37.4 (23 Dec 2019 23:27)  T(F): 98.4 (24 Dec 2019 07:25), Max: 99.4 (23 Dec 2019 23:27)  HR: 85 (24 Dec 2019 07:25) (67 - 95)  BP: 124/56 (24 Dec 2019 07:25) (95/51 - 124/56)  BP(mean): 81 (24 Dec 2019 07:25) (81 - 81)  RR: 18 (24 Dec 2019 07:25) (11 - 19)  SpO2: 85% (24 Dec 2019 07:25) (85% - 100%)    I&O's Detail    23 Dec 2019 07:01  -  24 Dec 2019 07:00  --------------------------------------------------------  IN:    IV PiggyBack: 150 mL    sodium chloride 0.9%: 525 mL    sodium chloride 0.9%.: 900 mL  Total IN: 1575 mL    OUT:    Voided: 500 mL  Total OUT: 500 mL    Total NET: 1075 mL    MEDICATIONS  (STANDING):  artificial  tears Solution 1 Drop(s) Both EYES every 4 hours  BACItracin   Ointment 1 Application(s) Topical two times a day  bacitracin/polymyxin B Ointment 1 Application(s) Topical two times a day  ceFAZolin   IVPB 2000 milliGRAM(s) IV Intermittent every 8 hours  enoxaparin Injectable 40 milliGRAM(s) SubCutaneous daily  ethinyl  estradiol-norethindrone (JUNEL FE) 1 Tablet(s) Oral daily  felbamate 600 milliGRAM(s) Oral three times a day  influenza   Vaccine 0.5 milliLiter(s) IntraMuscular once  levETIRAcetam 500 milliGRAM(s) Oral every 12 hours  multivitamin 1 Tablet(s) Oral daily  petrolatum Ophthalmic Ointment 1 Application(s) Right EYE every 12 hours  silver sulfADIAZINE 1% Cream 1 Application(s) Topical two times a day  sodium chloride 0.9%. 1000 milliLiter(s) (75 mL/Hr) IV Continuous <Continuous>    MEDICATIONS  (PRN):  acetaminophen   Tablet .. 650 milliGRAM(s) Oral every 6 hours PRN Temp greater or equal to 38C (100.4F), Mild Pain (1 - 3)  midazolam Injectable 1 milliGRAM(s) IV Push two times a day PRN wound care  morphine  - Injectable 2 milliGRAM(s) IV Push two times a day PRN wound care  morphine  - Injectable 2 milliGRAM(s) IV Push every 4 hours PRN Severe Pain (7 - 10)  ondansetron Injectable 4 milliGRAM(s) IV Push every 6 hours PRN for nausea/vomiting  oxycodone    5 mG/acetaminophen 325 mG 2 Tablet(s) Oral every 4 hours PRN Severe Pain (7 - 10)  oxymetazoline 0.05% Nasal Spray 1 Spray(s) Both Nostrils two times a day PRN Nasal Congestion  senna 2 Tablet(s) Oral at bedtime PRN Constipation    Allergies    No Known Allergies    Intolerances        Lab Results:                        9.6    8.94  )-----------( 212      ( 23 Dec 2019 20:25 )             29.0     12-23    136  |  102  |  5<L>  ----------------------------<  116<H>  4.5   |  24  |  0.5<L>    Ca    7.9<L>      23 Dec 2019 20:25  Phos  3.0     12-23  Mg     1.8     12-23        < from: CT Maxillofacial No Cont (12.18.19 @ 16:25) >    Right frontal and periorbital extracalvarial soft tissue swelling/hematoma. No evidence of underlying fracture. No evidence of globe injury.      < from: CT Head No Cont (12.18.19 @ 12:23) >  No acute intracranial hemorrhage, mass effect, or acute osseous fracture.   Right frontotemporal scalp hematoma.    Levetiracetam Level, Serum (12.19.19 @ 16:36)    Levetiracetam Level, Serum: <2.0:     12/23: pending    PHYSICAL EXAM: I have reviewed current vital signs.  GENERAL: awake alert ;   FACE - open wound right forehead with thinning yellow eschar; right periorbital ecchymosis, conjunctival heme  CHEST/LUNG: no cardiopulmonary distress   PSYCH: Cooperative; appropriate.  NEUROLOGY: AAO x 3.   SKIN: RUE - dressing CDI; NPWT in progress over STSG - no bleeding evident  right thigh small amt bloody drainage
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
## 2019-12-25 LAB
ANION GAP SERPL CALC-SCNC: 15 MMOL/L — HIGH (ref 7–14)
BASOPHILS # BLD AUTO: 0.02 K/UL — SIGNIFICANT CHANGE UP (ref 0–0.2)
BASOPHILS NFR BLD AUTO: 0.2 % — SIGNIFICANT CHANGE UP (ref 0–1)
BUN SERPL-MCNC: 5 MG/DL — LOW (ref 10–20)
CALCIUM SERPL-MCNC: 8.4 MG/DL — LOW (ref 8.5–10.1)
CHLORIDE SERPL-SCNC: 103 MMOL/L — SIGNIFICANT CHANGE UP (ref 98–110)
CO2 SERPL-SCNC: 21 MMOL/L — SIGNIFICANT CHANGE UP (ref 17–32)
CREAT SERPL-MCNC: 0.5 MG/DL — LOW (ref 0.7–1.5)
EOSINOPHIL # BLD AUTO: 0.16 K/UL — SIGNIFICANT CHANGE UP (ref 0–0.7)
EOSINOPHIL NFR BLD AUTO: 2 % — SIGNIFICANT CHANGE UP (ref 0–8)
GLUCOSE SERPL-MCNC: 123 MG/DL — HIGH (ref 70–99)
HCT VFR BLD CALC: 29 % — LOW (ref 37–47)
HGB BLD-MCNC: 9.8 G/DL — LOW (ref 12–16)
IMM GRANULOCYTES NFR BLD AUTO: 0.4 % — HIGH (ref 0.1–0.3)
LEVETIRACETAM SERPL-MCNC: 9.9 MCG/ML — LOW (ref 12–46)
LYMPHOCYTES # BLD AUTO: 1.73 K/UL — SIGNIFICANT CHANGE UP (ref 1.2–3.4)
LYMPHOCYTES # BLD AUTO: 21.5 % — SIGNIFICANT CHANGE UP (ref 20.5–51.1)
MAGNESIUM SERPL-MCNC: 1.8 MG/DL — SIGNIFICANT CHANGE UP (ref 1.8–2.4)
MCHC RBC-ENTMCNC: 30.7 PG — SIGNIFICANT CHANGE UP (ref 27–31)
MCHC RBC-ENTMCNC: 33.8 G/DL — SIGNIFICANT CHANGE UP (ref 32–37)
MCV RBC AUTO: 90.9 FL — SIGNIFICANT CHANGE UP (ref 81–99)
MONOCYTES # BLD AUTO: 0.42 K/UL — SIGNIFICANT CHANGE UP (ref 0.1–0.6)
MONOCYTES NFR BLD AUTO: 5.2 % — SIGNIFICANT CHANGE UP (ref 1.7–9.3)
NEUTROPHILS # BLD AUTO: 5.7 K/UL — SIGNIFICANT CHANGE UP (ref 1.4–6.5)
NEUTROPHILS NFR BLD AUTO: 70.7 % — SIGNIFICANT CHANGE UP (ref 42.2–75.2)
NRBC # BLD: 0 /100 WBCS — SIGNIFICANT CHANGE UP (ref 0–0)
PHOSPHATE SERPL-MCNC: 2.7 MG/DL — SIGNIFICANT CHANGE UP (ref 2.1–4.9)
PLATELET # BLD AUTO: 256 K/UL — SIGNIFICANT CHANGE UP (ref 130–400)
POTASSIUM SERPL-MCNC: 4 MMOL/L — SIGNIFICANT CHANGE UP (ref 3.5–5)
POTASSIUM SERPL-SCNC: 4 MMOL/L — SIGNIFICANT CHANGE UP (ref 3.5–5)
RBC # BLD: 3.19 M/UL — LOW (ref 4.2–5.4)
RBC # FLD: 12.4 % — SIGNIFICANT CHANGE UP (ref 11.5–14.5)
SODIUM SERPL-SCNC: 139 MMOL/L — SIGNIFICANT CHANGE UP (ref 135–146)
WBC # BLD: 8.06 K/UL — SIGNIFICANT CHANGE UP (ref 4.8–10.8)
WBC # FLD AUTO: 8.06 K/UL — SIGNIFICANT CHANGE UP (ref 4.8–10.8)

## 2019-12-25 PROCEDURE — 97605 NEG PRS WND THER DME<=50SQCM: CPT

## 2019-12-25 PROCEDURE — 99231 SBSQ HOSP IP/OBS SF/LOW 25: CPT | Mod: 25

## 2019-12-25 RX ORDER — POLYETHYLENE GLYCOL 3350 17 G/17G
17 POWDER, FOR SOLUTION ORAL DAILY
Refills: 0 | Status: DISCONTINUED | OUTPATIENT
Start: 2019-12-25 | End: 2019-12-28

## 2019-12-25 RX ORDER — SENNA PLUS 8.6 MG/1
2 TABLET ORAL AT BEDTIME
Refills: 0 | Status: DISCONTINUED | OUTPATIENT
Start: 2019-12-25 | End: 2019-12-25

## 2019-12-25 RX ADMIN — NORETHINDRONE AND ETHINYL ESTRADIOL 1 TABLET(S): KIT at 21:01

## 2019-12-25 RX ADMIN — ENOXAPARIN SODIUM 40 MILLIGRAM(S): 100 INJECTION SUBCUTANEOUS at 13:10

## 2019-12-25 RX ADMIN — Medication 1 DROP(S): at 13:10

## 2019-12-25 RX ADMIN — MORPHINE SULFATE 2 MILLIGRAM(S): 50 CAPSULE, EXTENDED RELEASE ORAL at 01:07

## 2019-12-25 RX ADMIN — OXYCODONE AND ACETAMINOPHEN 2 TABLET(S): 5; 325 TABLET ORAL at 03:40

## 2019-12-25 RX ADMIN — MORPHINE SULFATE 2 MILLIGRAM(S): 50 CAPSULE, EXTENDED RELEASE ORAL at 06:31

## 2019-12-25 RX ADMIN — BACITRACIN AND POLYMYXIN B SULFATE 1 APPLICATION(S): 500; 10000 OINTMENT TOPICAL at 10:58

## 2019-12-25 RX ADMIN — Medication 1 DROP(S): at 10:58

## 2019-12-25 RX ADMIN — OXYCODONE AND ACETAMINOPHEN 2 TABLET(S): 5; 325 TABLET ORAL at 08:15

## 2019-12-25 RX ADMIN — SODIUM CHLORIDE 75 MILLILITER(S): 9 INJECTION INTRAMUSCULAR; INTRAVENOUS; SUBCUTANEOUS at 06:26

## 2019-12-25 RX ADMIN — FELBAMATE 600 MILLIGRAM(S): 600 TABLET ORAL at 06:23

## 2019-12-25 RX ADMIN — OXYCODONE AND ACETAMINOPHEN 2 TABLET(S): 5; 325 TABLET ORAL at 02:59

## 2019-12-25 RX ADMIN — FELBAMATE 600 MILLIGRAM(S): 600 TABLET ORAL at 21:01

## 2019-12-25 RX ADMIN — SENNA PLUS 2 TABLET(S): 8.6 TABLET ORAL at 21:02

## 2019-12-25 RX ADMIN — Medication 100 MILLIGRAM(S): at 21:00

## 2019-12-25 RX ADMIN — Medication 15 MILLIGRAM(S): at 21:44

## 2019-12-25 RX ADMIN — Medication 1 TABLET(S): at 13:10

## 2019-12-25 RX ADMIN — BACITRACIN AND POLYMYXIN B SULFATE 1 APPLICATION(S): 500; 10000 OINTMENT TOPICAL at 21:01

## 2019-12-25 RX ADMIN — Medication 1 APPLICATION(S): at 10:58

## 2019-12-25 RX ADMIN — LEVETIRACETAM 500 MILLIGRAM(S): 250 TABLET, FILM COATED ORAL at 06:23

## 2019-12-25 RX ADMIN — OXYCODONE AND ACETAMINOPHEN 2 TABLET(S): 5; 325 TABLET ORAL at 07:29

## 2019-12-25 RX ADMIN — Medication 15 MILLIGRAM(S): at 22:34

## 2019-12-25 RX ADMIN — MORPHINE SULFATE 2 MILLIGRAM(S): 50 CAPSULE, EXTENDED RELEASE ORAL at 10:57

## 2019-12-25 RX ADMIN — MORPHINE SULFATE 2 MILLIGRAM(S): 50 CAPSULE, EXTENDED RELEASE ORAL at 20:59

## 2019-12-25 RX ADMIN — Medication 1 APPLICATION(S): at 17:45

## 2019-12-25 RX ADMIN — LEVETIRACETAM 500 MILLIGRAM(S): 250 TABLET, FILM COATED ORAL at 17:45

## 2019-12-25 RX ADMIN — Medication 100 MILLIGRAM(S): at 06:22

## 2019-12-25 RX ADMIN — Medication 1 DROP(S): at 06:24

## 2019-12-25 RX ADMIN — MORPHINE SULFATE 2 MILLIGRAM(S): 50 CAPSULE, EXTENDED RELEASE ORAL at 21:41

## 2019-12-25 RX ADMIN — SENNA PLUS 2 TABLET(S): 8.6 TABLET ORAL at 02:58

## 2019-12-25 RX ADMIN — MORPHINE SULFATE 2 MILLIGRAM(S): 50 CAPSULE, EXTENDED RELEASE ORAL at 01:27

## 2019-12-25 RX ADMIN — Medication 15 MILLIGRAM(S): at 14:22

## 2019-12-25 RX ADMIN — Medication 1 DROP(S): at 17:44

## 2019-12-25 RX ADMIN — OXYCODONE AND ACETAMINOPHEN 2 TABLET(S): 5; 325 TABLET ORAL at 13:40

## 2019-12-25 RX ADMIN — Medication 15 MILLIGRAM(S): at 15:00

## 2019-12-25 RX ADMIN — FELBAMATE 600 MILLIGRAM(S): 600 TABLET ORAL at 13:26

## 2019-12-25 RX ADMIN — MORPHINE SULFATE 2 MILLIGRAM(S): 50 CAPSULE, EXTENDED RELEASE ORAL at 12:04

## 2019-12-25 RX ADMIN — OXYCODONE AND ACETAMINOPHEN 2 TABLET(S): 5; 325 TABLET ORAL at 14:20

## 2019-12-25 RX ADMIN — OXYCODONE AND ACETAMINOPHEN 2 TABLET(S): 5; 325 TABLET ORAL at 19:04

## 2019-12-25 RX ADMIN — MORPHINE SULFATE 2 MILLIGRAM(S): 50 CAPSULE, EXTENDED RELEASE ORAL at 16:15

## 2019-12-25 RX ADMIN — Medication 1 APPLICATION(S): at 06:24

## 2019-12-25 RX ADMIN — OXYCODONE AND ACETAMINOPHEN 2 TABLET(S): 5; 325 TABLET ORAL at 19:46

## 2019-12-25 RX ADMIN — Medication 100 MILLIGRAM(S): at 14:22

## 2019-12-25 RX ADMIN — Medication 1 DROP(S): at 21:01

## 2019-12-25 RX ADMIN — Medication 1 APPLICATION(S): at 21:01

## 2019-12-25 RX ADMIN — Medication 1 DROP(S): at 01:12
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
## 2019-12-26 PROCEDURE — 99232 SBSQ HOSP IP/OBS MODERATE 35: CPT

## 2019-12-26 RX ORDER — MAGNESIUM SULFATE 500 MG/ML
2 VIAL (ML) INJECTION ONCE
Refills: 0 | Status: COMPLETED | OUTPATIENT
Start: 2019-12-26 | End: 2019-12-26

## 2019-12-26 RX ORDER — MORPHINE SULFATE 50 MG/1
2 CAPSULE, EXTENDED RELEASE ORAL ONCE
Refills: 0 | Status: DISCONTINUED | OUTPATIENT
Start: 2019-12-26 | End: 2019-12-26

## 2019-12-26 RX ADMIN — Medication 1 APPLICATION(S): at 21:28

## 2019-12-26 RX ADMIN — MORPHINE SULFATE 2 MILLIGRAM(S): 50 CAPSULE, EXTENDED RELEASE ORAL at 12:54

## 2019-12-26 RX ADMIN — Medication 100 MILLIGRAM(S): at 21:28

## 2019-12-26 RX ADMIN — OXYCODONE AND ACETAMINOPHEN 2 TABLET(S): 5; 325 TABLET ORAL at 20:23

## 2019-12-26 RX ADMIN — OXYCODONE AND ACETAMINOPHEN 2 TABLET(S): 5; 325 TABLET ORAL at 06:04

## 2019-12-26 RX ADMIN — NORETHINDRONE AND ETHINYL ESTRADIOL 1 TABLET(S): KIT at 21:27

## 2019-12-26 RX ADMIN — Medication 1 DROP(S): at 09:28

## 2019-12-26 RX ADMIN — MORPHINE SULFATE 2 MILLIGRAM(S): 50 CAPSULE, EXTENDED RELEASE ORAL at 09:54

## 2019-12-26 RX ADMIN — MORPHINE SULFATE 2 MILLIGRAM(S): 50 CAPSULE, EXTENDED RELEASE ORAL at 21:47

## 2019-12-26 RX ADMIN — OXYCODONE AND ACETAMINOPHEN 2 TABLET(S): 5; 325 TABLET ORAL at 09:58

## 2019-12-26 RX ADMIN — Medication 100 MILLIGRAM(S): at 06:03

## 2019-12-26 RX ADMIN — MORPHINE SULFATE 2 MILLIGRAM(S): 50 CAPSULE, EXTENDED RELEASE ORAL at 10:23

## 2019-12-26 RX ADMIN — BACITRACIN AND POLYMYXIN B SULFATE 1 APPLICATION(S): 500; 10000 OINTMENT TOPICAL at 21:28

## 2019-12-26 RX ADMIN — MORPHINE SULFATE 2 MILLIGRAM(S): 50 CAPSULE, EXTENDED RELEASE ORAL at 03:02

## 2019-12-26 RX ADMIN — FELBAMATE 600 MILLIGRAM(S): 600 TABLET ORAL at 06:03

## 2019-12-26 RX ADMIN — OXYCODONE AND ACETAMINOPHEN 2 TABLET(S): 5; 325 TABLET ORAL at 11:35

## 2019-12-26 RX ADMIN — OXYCODONE AND ACETAMINOPHEN 2 TABLET(S): 5; 325 TABLET ORAL at 00:46

## 2019-12-26 RX ADMIN — OXYCODONE AND ACETAMINOPHEN 2 TABLET(S): 5; 325 TABLET ORAL at 18:53

## 2019-12-26 RX ADMIN — Medication 1 DROP(S): at 02:15

## 2019-12-26 RX ADMIN — MIDAZOLAM HYDROCHLORIDE 1 MILLIGRAM(S): 1 INJECTION, SOLUTION INTRAMUSCULAR; INTRAVENOUS at 08:50

## 2019-12-26 RX ADMIN — Medication 100 MILLIGRAM(S): at 13:15

## 2019-12-26 RX ADMIN — Medication 15 MILLIGRAM(S): at 06:02

## 2019-12-26 RX ADMIN — Medication 15 MILLIGRAM(S): at 07:12

## 2019-12-26 RX ADMIN — OXYCODONE AND ACETAMINOPHEN 2 TABLET(S): 5; 325 TABLET ORAL at 14:17

## 2019-12-26 RX ADMIN — Medication 1 TABLET(S): at 12:56

## 2019-12-26 RX ADMIN — Medication 1 DROP(S): at 21:28

## 2019-12-26 RX ADMIN — OXYCODONE AND ACETAMINOPHEN 2 TABLET(S): 5; 325 TABLET ORAL at 05:02

## 2019-12-26 RX ADMIN — ENOXAPARIN SODIUM 40 MILLIGRAM(S): 100 INJECTION SUBCUTANEOUS at 12:56

## 2019-12-26 RX ADMIN — FELBAMATE 600 MILLIGRAM(S): 600 TABLET ORAL at 21:27

## 2019-12-26 RX ADMIN — OXYCODONE AND ACETAMINOPHEN 2 TABLET(S): 5; 325 TABLET ORAL at 23:21

## 2019-12-26 RX ADMIN — Medication 1 DROP(S): at 17:41

## 2019-12-26 RX ADMIN — MORPHINE SULFATE 2 MILLIGRAM(S): 50 CAPSULE, EXTENDED RELEASE ORAL at 08:50

## 2019-12-26 RX ADMIN — MORPHINE SULFATE 2 MILLIGRAM(S): 50 CAPSULE, EXTENDED RELEASE ORAL at 22:49

## 2019-12-26 RX ADMIN — Medication 1 DROP(S): at 06:03

## 2019-12-26 RX ADMIN — FELBAMATE 600 MILLIGRAM(S): 600 TABLET ORAL at 13:15

## 2019-12-26 RX ADMIN — LEVETIRACETAM 500 MILLIGRAM(S): 250 TABLET, FILM COATED ORAL at 17:42

## 2019-12-26 RX ADMIN — MORPHINE SULFATE 2 MILLIGRAM(S): 50 CAPSULE, EXTENDED RELEASE ORAL at 10:52

## 2019-12-26 RX ADMIN — OXYCODONE AND ACETAMINOPHEN 2 TABLET(S): 5; 325 TABLET ORAL at 15:20

## 2019-12-26 RX ADMIN — Medication 50 GRAM(S): at 10:51

## 2019-12-26 RX ADMIN — Medication 1 APPLICATION(S): at 17:42

## 2019-12-26 RX ADMIN — BACITRACIN AND POLYMYXIN B SULFATE 1 APPLICATION(S): 500; 10000 OINTMENT TOPICAL at 09:00

## 2019-12-26 RX ADMIN — Medication 1 DROP(S): at 13:14

## 2019-12-26 RX ADMIN — Medication 1 APPLICATION(S): at 08:50

## 2019-12-26 RX ADMIN — LEVETIRACETAM 500 MILLIGRAM(S): 250 TABLET, FILM COATED ORAL at 06:03

## 2019-12-26 RX ADMIN — MORPHINE SULFATE 2 MILLIGRAM(S): 50 CAPSULE, EXTENDED RELEASE ORAL at 17:44

## 2019-12-26 RX ADMIN — OXYCODONE AND ACETAMINOPHEN 2 TABLET(S): 5; 325 TABLET ORAL at 02:15

## 2019-12-26 RX ADMIN — MORPHINE SULFATE 2 MILLIGRAM(S): 50 CAPSULE, EXTENDED RELEASE ORAL at 05:10

## 2019-12-26 RX ADMIN — Medication 1 APPLICATION(S): at 06:03
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
## 2019-12-26 NOTE — PROGRESS NOTE ADULT - SUBJECTIVE AND OBJECTIVE BOX
am rounds      INTERVAL HISTORY:  No acute issues overnight. no complaints   POD# 3  s/p  skin graft placement. Repeat Keppra level 9.9, request neuro f/u regarding level        ICU Vital Signs Last 24 Hrs  T(C): 36.4 (26 Dec 2019 07:39), Max: 36.6 (25 Dec 2019 16:55)  T(F): 97.6 (26 Dec 2019 07:39), Max: 97.8 (25 Dec 2019 16:55)  HR: 90 (26 Dec 2019 07:39) (80 - 93)  BP: 108/56 (26 Dec 2019 07:39) (108/56 - 116/56)  BP(mean): 79 (25 Dec 2019 23:54) (79 - 81)  ABP: --  ABP(mean): --  RR: 18 (26 Dec 2019 07:39) (16 - 20)  SpO2: 100% (25 Dec 2019 23:54) (98% - 100%)    I&O's Detail    25 Dec 2019 07:01  -  26 Dec 2019 07:00  --------------------------------------------------------  IN:    IV PiggyBack: 100 mL  Total IN: 100 mL    OUT:    Voided: 2 mL  Total OUT: 2 mL    Total NET: 98 mL          MEDICATIONS  (STANDING):  artificial  tears Solution 1 Drop(s) Both EYES every 4 hours  BACItracin   Ointment 1 Application(s) Topical two times a day  bacitracin/polymyxin B Ointment 1 Application(s) Topical two times a day  ceFAZolin   IVPB 2000 milliGRAM(s) IV Intermittent every 8 hours  enoxaparin Injectable 40 milliGRAM(s) SubCutaneous daily  ethinyl  estradiol-norethindrone (JUNEL FE) 1 Tablet(s) Oral daily  felbamate 600 milliGRAM(s) Oral three times a day  influenza   Vaccine 0.5 milliLiter(s) IntraMuscular once  levETIRAcetam 500 milliGRAM(s) Oral every 12 hours  multivitamin 1 Tablet(s) Oral daily  petrolatum Ophthalmic Ointment 1 Application(s) Right EYE every 12 hours    MEDICATIONS  (PRN):  acetaminophen   Tablet .. 650 milliGRAM(s) Oral every 6 hours PRN Temp greater or equal to 38C (100.4F), Mild Pain (1 - 3)  ketorolac   Injectable 15 milliGRAM(s) IV Push every 6 hours PRN Moderate Pain (4 - 6)  midazolam Injectable 1 milliGRAM(s) IV Push two times a day PRN wound care  morphine  - Injectable 2 milliGRAM(s) IV Push two times a day PRN wound care  morphine  - Injectable 2 milliGRAM(s) IV Push every 4 hours PRN Severe Pain (7 - 10)  ondansetron Injectable 4 milliGRAM(s) IV Push every 6 hours PRN for nausea/vomiting  oxycodone    5 mG/acetaminophen 325 mG 2 Tablet(s) Oral every 4 hours PRN Severe Pain (7 - 10)  oxymetazoline 0.05% Nasal Spray 1 Spray(s) Both Nostrils two times a day PRN Nasal Congestion  polyethylene glycol 3350 17 Gram(s) Oral daily PRN Constipation  senna 2 Tablet(s) Oral at bedtime PRN Constipation      Allergies    No Known Allergies    Intolerances        Lab Results:                                    9.8    8.06  )-----------( 256      ( 25 Dec 2019 16:37 )             29.0     12-25    139  |  103  |  5<L>  ----------------------------<  123<H>  4.0   |  21  |  0.5<L>    Ca    8.4<L>      25 Dec 2019 16:37  Phos  2.7     12-25  Mg     1.8     12-25            < from: CT Maxillofacial No Cont (12.18.19 @ 16:25) >    Right frontal and periorbital extracalvarial soft tissue swelling/hematoma. No evidence of underlying fracture. No evidence of globe injury.      < from: CT Head No Cont (12.18.19 @ 12:23) >  No acute intracranial hemorrhage, mass effect, or acute osseous fracture.   Right frontotemporal scalp hematoma.    Levetiracetam Level, Serum (12.23.19 @ 0430)    Levetiracetam Level, Serum: 9.9      PHYSICAL EXAM: I have reviewed current vital signs.  GENERAL: awake alert ;   FACE - open wound right forehead with thinning yellow eschar; right periorbital ecchymosis, conjunctival heme  CHEST/LUNG: no cardiopulmonary distress   PSYCH: Cooperative; appropriate.  NEUROLOGY: AAO x 3.   SKIN: RUE - STSG in place w/ staples - minimal bleeding  right thigh small amt bloody drainage am rounds      INTERVAL HISTORY:  No acute issues overnight. no complaints   POD# 3  s/p  skin graft placement. Repeat Keppra level 9.9, request neuro f/u regarding level        ICU Vital Signs Last 24 Hrs  T(C): 36.4 (26 Dec 2019 07:39), Max: 36.6 (25 Dec 2019 16:55)  T(F): 97.6 (26 Dec 2019 07:39), Max: 97.8 (25 Dec 2019 16:55)  HR: 90 (26 Dec 2019 07:39) (80 - 93)  BP: 108/56 (26 Dec 2019 07:39) (108/56 - 116/56)  BP(mean): 79 (25 Dec 2019 23:54) (79 - 81)  ABP: --  ABP(mean): --  RR: 18 (26 Dec 2019 07:39) (16 - 20)  SpO2: 100% (25 Dec 2019 23:54) (98% - 100%)    I&O's Detail    25 Dec 2019 07:01  -  26 Dec 2019 07:00  --------------------------------------------------------  IN:    IV PiggyBack: 100 mL  Total IN: 100 mL    OUT:    Voided: 2 mL  Total OUT: 2 mL    Total NET: 98 mL  MEDICATIONS  (STANDING):  artificial  tears Solution 1 Drop(s) Both EYES every 4 hours  BACItracin   Ointment 1 Application(s) Topical two times a day  bacitracin/polymyxin B Ointment 1 Application(s) Topical two times a day  ceFAZolin   IVPB 2000 milliGRAM(s) IV Intermittent every 8 hours  enoxaparin Injectable 40 milliGRAM(s) SubCutaneous daily  ethinyl  estradiol-norethindrone (JUNEL FE) 1 Tablet(s) Oral daily  felbamate 600 milliGRAM(s) Oral three times a day  influenza   Vaccine 0.5 milliLiter(s) IntraMuscular once  levETIRAcetam 500 milliGRAM(s) Oral every 12 hours  multivitamin 1 Tablet(s) Oral daily  petrolatum Ophthalmic Ointment 1 Application(s) Right EYE every 12 hours    MEDICATIONS  (PRN):  acetaminophen   Tablet .. 650 milliGRAM(s) Oral every 6 hours PRN Temp greater or equal to 38C (100.4F), Mild Pain (1 - 3)  ketorolac   Injectable 15 milliGRAM(s) IV Push every 6 hours PRN Moderate Pain (4 - 6)  midazolam Injectable 1 milliGRAM(s) IV Push two times a day PRN wound care  morphine  - Injectable 2 milliGRAM(s) IV Push two times a day PRN wound care  morphine  - Injectable 2 milliGRAM(s) IV Push every 4 hours PRN Severe Pain (7 - 10)  ondansetron Injectable 4 milliGRAM(s) IV Push every 6 hours PRN for nausea/vomiting  oxycodone    5 mG/acetaminophen 325 mG 2 Tablet(s) Oral every 4 hours PRN Severe Pain (7 - 10)  oxymetazoline 0.05% Nasal Spray 1 Spray(s) Both Nostrils two times a day PRN Nasal Congestion  polyethylene glycol 3350 17 Gram(s) Oral daily PRN Constipation  senna 2 Tablet(s) Oral at bedtime PRN Constipation      Allergies    No Known Allergies    Intolerances        Lab Results:                                    9.8    8.06  )-----------( 256      ( 25 Dec 2019 16:37 )             29.0     12-25    139  |  103  |  5<L>  ----------------------------<  123<H>  4.0   |  21  |  0.5<L>    Ca    8.4<L>      25 Dec 2019 16:37  Phos  2.7     12-25  Mg     1.8     12-25            < from: CT Maxillofacial No Cont (12.18.19 @ 16:25) >    Right frontal and periorbital extracalvarial soft tissue swelling/hematoma. No evidence of underlying fracture. No evidence of globe injury.      < from: CT Head No Cont (12.18.19 @ 12:23) >  No acute intracranial hemorrhage, mass effect, or acute osseous fracture.   Right frontotemporal scalp hematoma.    Levetiracetam Level, Serum (12.23.19 @ 0430)    Levetiracetam Level, Serum: 9.9      PHYSICAL EXAM: I have reviewed current vital signs.  GENERAL: awake alert ;   FACE - open wound right forehead with thinning yellow eschar; right periorbital ecchymosis, conjunctival heme  CHEST/LUNG: no cardiopulmonary distress   PSYCH: Cooperative; appropriate.  NEUROLOGY: AAO x 3.   SKIN: RUE - STSG in place w/ staples - minimal bleeding  right thigh small amt bloody drainage

## 2019-12-27 ENCOUNTER — TRANSCRIPTION ENCOUNTER (OUTPATIENT)
Age: 24
End: 2019-12-27

## 2019-12-27 PROCEDURE — 99238 HOSP IP/OBS DSCHRG MGMT 30/<: CPT

## 2019-12-27 RX ADMIN — MORPHINE SULFATE 2 MILLIGRAM(S): 50 CAPSULE, EXTENDED RELEASE ORAL at 06:25

## 2019-12-27 RX ADMIN — NORETHINDRONE AND ETHINYL ESTRADIOL 1 TABLET(S): KIT at 21:16

## 2019-12-27 RX ADMIN — Medication 1 APPLICATION(S): at 17:16

## 2019-12-27 RX ADMIN — Medication 1 DROP(S): at 17:15

## 2019-12-27 RX ADMIN — MORPHINE SULFATE 2 MILLIGRAM(S): 50 CAPSULE, EXTENDED RELEASE ORAL at 05:57

## 2019-12-27 RX ADMIN — BACITRACIN AND POLYMYXIN B SULFATE 1 APPLICATION(S): 500; 10000 OINTMENT TOPICAL at 11:22

## 2019-12-27 RX ADMIN — FELBAMATE 600 MILLIGRAM(S): 600 TABLET ORAL at 21:17

## 2019-12-27 RX ADMIN — Medication 1 APPLICATION(S): at 21:17

## 2019-12-27 RX ADMIN — MORPHINE SULFATE 2 MILLIGRAM(S): 50 CAPSULE, EXTENDED RELEASE ORAL at 20:32

## 2019-12-27 RX ADMIN — Medication 1 DROP(S): at 11:21

## 2019-12-27 RX ADMIN — Medication 1 APPLICATION(S): at 05:51

## 2019-12-27 RX ADMIN — OXYCODONE AND ACETAMINOPHEN 2 TABLET(S): 5; 325 TABLET ORAL at 11:50

## 2019-12-27 RX ADMIN — LEVETIRACETAM 500 MILLIGRAM(S): 250 TABLET, FILM COATED ORAL at 05:51

## 2019-12-27 RX ADMIN — OXYCODONE AND ACETAMINOPHEN 2 TABLET(S): 5; 325 TABLET ORAL at 11:20

## 2019-12-27 RX ADMIN — FELBAMATE 600 MILLIGRAM(S): 600 TABLET ORAL at 05:51

## 2019-12-27 RX ADMIN — LEVETIRACETAM 500 MILLIGRAM(S): 250 TABLET, FILM COATED ORAL at 17:16

## 2019-12-27 RX ADMIN — BACITRACIN AND POLYMYXIN B SULFATE 1 APPLICATION(S): 500; 10000 OINTMENT TOPICAL at 21:16

## 2019-12-27 RX ADMIN — Medication 100 MILLIGRAM(S): at 05:51

## 2019-12-27 RX ADMIN — ENOXAPARIN SODIUM 40 MILLIGRAM(S): 100 INJECTION SUBCUTANEOUS at 13:33

## 2019-12-27 RX ADMIN — Medication 1 DROP(S): at 05:51

## 2019-12-27 RX ADMIN — Medication 1 TABLET(S): at 13:33

## 2019-12-27 RX ADMIN — MORPHINE SULFATE 2 MILLIGRAM(S): 50 CAPSULE, EXTENDED RELEASE ORAL at 19:34

## 2019-12-27 RX ADMIN — Medication 1 DROP(S): at 21:17

## 2019-12-27 RX ADMIN — OXYCODONE AND ACETAMINOPHEN 2 TABLET(S): 5; 325 TABLET ORAL at 00:51

## 2019-12-27 RX ADMIN — Medication 100 MILLIGRAM(S): at 13:33

## 2019-12-27 RX ADMIN — Medication 1 DROP(S): at 13:34

## 2019-12-27 RX ADMIN — Medication 100 MILLIGRAM(S): at 21:17

## 2019-12-27 RX ADMIN — Medication 1 DROP(S): at 02:00

## 2019-12-27 RX ADMIN — Medication 1 APPLICATION(S): at 11:21

## 2019-12-27 RX ADMIN — FELBAMATE 600 MILLIGRAM(S): 600 TABLET ORAL at 13:33

## 2019-12-27 NOTE — DISCHARGE NOTE PROVIDER - NSFOLLOWUPCLINICS_GEN_ALL_ED_FT
[facility]  Burn  [address], [address]  [place] [address]  Phone: [phone]  Fax:   Follow Up Time: 1 week

## 2019-12-27 NOTE — DISCHARGE NOTE PROVIDER - CARE PROVIDERS DIRECT ADDRESSES
[email or web address]
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
## 2019-12-27 NOTE — DISCHARGE NOTE PROVIDER - NSDCCPTREATMENT_GEN_ALL_CORE_FT
PRINCIPAL PROCEDURE  Procedure: Split-thickness skin graft (STSG) to upper extremity  Findings and Treatment: continue wound care and follow up in BURN Clinic.
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
## 2019-12-27 NOTE — DISCHARGE NOTE PROVIDER - NSDCMRMEDTOKEN_GEN_ALL_CORE_FT
felbamate 600 mg oral tablet: 1 tab(s) orally 3 times a day  Junel Fe 1/20 oral tablet: 1 tab(s) orally once a day  Keppra 500 mg oral tablet: 1 tab(s) orally once a day felbamate 600 mg oral tablet: 1 tab(s) orally 3 times a day  Junel Fe 1/20 oral tablet: 1 tab(s) orally once a day  Junel Fe 1/20 oral tablet: 1 tab(s) orally once a day   levETIRAcetam 500 mg oral tablet: 1 tab(s) orally every 12 hours  oxycodone-acetaminophen 5 mg-325 mg oral tablet: 1 tab(s) orally every 6 hours MDD:4 tabs

## 2019-12-27 NOTE — PROGRESS NOTE ADULT - SUBJECTIVE AND OBJECTIVE BOX
am rounds      INTERVAL HISTORY:  No acute issues overnight. no complaints   POD# 4 s/p skin graft placement. FTD performed yesterday and graft is progressing well. Repeat Keppra level 9.9; neuro evaluated patient yesterday and recommend continuing current dose of Keppra: 500mg BID.     Vital Signs Last 24 Hrs  T(C): 36.9 (27 Dec 2019 07:34), Max: 36.9 (27 Dec 2019 07:34)  T(F): 98.4 (27 Dec 2019 07:34), Max: 98.4 (27 Dec 2019 07:34)  HR: 72 (27 Dec 2019 07:34) (72 - 86)  BP: 103/52 (27 Dec 2019 07:34) (103/52 - 111/59)  BP(mean): 72 (26 Dec 2019 15:20) (72 - 72)  RR: 18 (27 Dec 2019 07:34) (18 - 20)  SpO2: 98% (26 Dec 2019 15:20) (98% - 98%)       Allergies    No Known Allergies        I&O's Summary    26 Dec 2019 07:01  -  27 Dec 2019 07:00  --------------------------------------------------------  IN: 100 mL / OUT: 0 mL / NET: 100 mL        12-25    139  |  103  |  5<L>  ----------------------------<  123<H>  4.0   |  21  |  0.5<L>    Ca    8.4<L>      25 Dec 2019 16:37  Phos  2.7     12-25  Mg     1.8     12-25                            9.8    8.06  )-----------( 256      ( 25 Dec 2019 16:37 )             29.0       < from: CT Maxillofacial No Cont (12.18.19 @ 16:25) >    Right frontal and periorbital extracalvarial soft tissue swelling/hematoma. No evidence of underlying fracture. No evidence of globe injury.      < from: CT Head No Cont (12.18.19 @ 12:23) >  No acute intracranial hemorrhage, mass effect, or acute osseous fracture.   Right frontotemporal scalp hematoma.    Levetiracetam Level, Serum (12.23.19 @ 0430)    Levetiracetam Level, Serum: 9.9      PHYSICAL EXAM: I have reviewed current vital signs.  GENERAL: awake alert   FACE - open wound right forehead with thinning yellow eschar; right periorbital ecchymosis, subconjunctival hemorrhage   CHEST/LUNG: no cardiopulmonary distress   PSYCH: Cooperative; appropriate.  NEUROLOGY: AAO x 3.   SKIN: RUE - STSG in place w/ staples - minimal bleeding  right thigh small amt bloody drainage noted underneath duoderm am rounds      INTERVAL HISTORY:  No acute issues overnight. no complaints   POD# 4 s/p skin graft placement. FTD performed yesterday and graft is progressing well. Repeat Keppra level 9.9; neuro evaluated patient yesterday and recommend continuing current dose of Keppra: 500mg BID.     Vital Signs Last 24 Hrs  T(C): 36.9 (27 Dec 2019 07:34), Max: 36.9 (27 Dec 2019 07:34)  T(F): 98.4 (27 Dec 2019 07:34), Max: 98.4 (27 Dec 2019 07:34)  HR: 72 (27 Dec 2019 07:34) (72 - 86)  BP: 103/52 (27 Dec 2019 07:34) (103/52 - 111/59)  BP(mean): 72 (26 Dec 2019 15:20) (72 - 72)  RR: 18 (27 Dec 2019 07:34) (18 - 20)  SpO2: 98% (26 Dec 2019 15:20) (98% - 98%)       Allergies    No Known Allergies        I&O's Summary    26 Dec 2019 07:01  -  27 Dec 2019 07:00  --------------------------------------------------------  IN: 100 mL / OUT: 0 mL / NET: 100 mL        12-25    139  |  103  |  5<L>  ----------------------------<  123<H>  4.0   |  21  |  0.5<L>    Ca    8.4<L>      25 Dec 2019 16:37  Phos  2.7     12-25  Mg     1.8     12-25                            9.8    8.06  )-----------( 256      ( 25 Dec 2019 16:37 )             29.0       < from: CT Maxillofacial No Cont (12.18.19 @ 16:25) >    Right frontal and periorbital extracalvarial soft tissue swelling/hematoma. No evidence of underlying fracture. No evidence of globe injury.      < from: CT Head No Cont (12.18.19 @ 12:23) >  No acute intracranial hemorrhage, mass effect, or acute osseous fracture.   Right frontotemporal scalp hematoma.    Levetiracetam Level, Serum (12.23.19 @ 0430)    Levetiracetam Level, Serum: 9.9      PHYSICAL EXAM: I have reviewed current vital signs.  GENERAL: awake alert   FACE - open wound right forehead with thinning yellow eschar; right periorbital ecchymosis, subconjunctival hemorrhage   CHEST/LUNG: no cardiopulmonary distress   PSYCH: Cooperative; appropriate.  NEUROLOGY: AAO x 3.   SKIN: RUE - dressing over STSG   right thigh small amt bloody drainage noted underneath duoderm

## 2019-12-27 NOTE — DISCHARGE NOTE PROVIDER - CARE PROVIDER_API CALL
Wood Anthony)  Plastic Surgery  [address] [place] [address]  Phone: [phone]  Fax: [phone]  Follow Up Time:     Timi Tomas)  Plastic Surgery  [address] [place] [address]  Phone: [phone]  Fax: [phone]  Follow Up Time:
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
## 2019-12-28 ENCOUNTER — TRANSCRIPTION ENCOUNTER (OUTPATIENT)
Age: 24
End: 2019-12-28

## 2019-12-28 VITALS — WEIGHT: 209.88 LBS

## 2019-12-28 PROCEDURE — 99238 HOSP IP/OBS DSCHRG MGMT 30/<: CPT

## 2019-12-28 RX ORDER — NORETHINDRONE AND ETHINYL ESTRADIOL 0.4-0.035
1 KIT ORAL
Qty: 30 | Refills: 0
Start: 2019-12-28 | End: 2020-01-26

## 2019-12-28 RX ORDER — FELBAMATE 600 MG/1
1 TABLET ORAL
Qty: 0 | Refills: 0 | DISCHARGE

## 2019-12-28 RX ORDER — LEVETIRACETAM 250 MG/1
1 TABLET, FILM COATED ORAL
Qty: 60 | Refills: 0
Start: 2019-12-28 | End: 2020-01-26

## 2019-12-28 RX ORDER — LEVETIRACETAM 250 MG/1
1 TABLET, FILM COATED ORAL
Qty: 0 | Refills: 0 | DISCHARGE

## 2019-12-28 RX ORDER — FELBAMATE 600 MG/1
1 TABLET ORAL
Qty: 90 | Refills: 0
Start: 2019-12-28 | End: 2020-01-26

## 2019-12-28 RX ORDER — LEVETIRACETAM 250 MG/1
1 TABLET, FILM COATED ORAL
Qty: 0 | Refills: 0 | DISCHARGE
Start: 2019-12-28

## 2019-12-28 RX ADMIN — ENOXAPARIN SODIUM 40 MILLIGRAM(S): 100 INJECTION SUBCUTANEOUS at 11:10

## 2019-12-28 RX ADMIN — INFLUENZA VIRUS VACCINE 0.5 MILLILITER(S): 15; 15; 15; 15 SUSPENSION INTRAMUSCULAR at 13:44

## 2019-12-28 RX ADMIN — BACITRACIN AND POLYMYXIN B SULFATE 1 APPLICATION(S): 500; 10000 OINTMENT TOPICAL at 10:00

## 2019-12-28 RX ADMIN — Medication 1 APPLICATION(S): at 10:00

## 2019-12-28 RX ADMIN — Medication 1 TABLET(S): at 11:11

## 2019-12-28 RX ADMIN — LEVETIRACETAM 500 MILLIGRAM(S): 250 TABLET, FILM COATED ORAL at 06:07

## 2019-12-28 RX ADMIN — MORPHINE SULFATE 2 MILLIGRAM(S): 50 CAPSULE, EXTENDED RELEASE ORAL at 10:00

## 2019-12-28 RX ADMIN — Medication 1 DROP(S): at 10:00

## 2019-12-28 RX ADMIN — Medication 1 APPLICATION(S): at 06:08

## 2019-12-28 RX ADMIN — MIDAZOLAM HYDROCHLORIDE 1 MILLIGRAM(S): 1 INJECTION, SOLUTION INTRAMUSCULAR; INTRAVENOUS at 09:32

## 2019-12-28 RX ADMIN — Medication 100 MILLIGRAM(S): at 06:08

## 2019-12-28 RX ADMIN — MORPHINE SULFATE 2 MILLIGRAM(S): 50 CAPSULE, EXTENDED RELEASE ORAL at 09:32

## 2019-12-28 RX ADMIN — MORPHINE SULFATE 2 MILLIGRAM(S): 50 CAPSULE, EXTENDED RELEASE ORAL at 01:07

## 2019-12-28 RX ADMIN — Medication 1 DROP(S): at 01:14

## 2019-12-28 RX ADMIN — MORPHINE SULFATE 2 MILLIGRAM(S): 50 CAPSULE, EXTENDED RELEASE ORAL at 01:40

## 2019-12-28 RX ADMIN — FELBAMATE 600 MILLIGRAM(S): 600 TABLET ORAL at 06:07

## 2019-12-28 RX ADMIN — Medication 1 DROP(S): at 06:07

## 2019-12-28 RX ADMIN — FELBAMATE 600 MILLIGRAM(S): 600 TABLET ORAL at 13:11

## 2019-12-28 RX ADMIN — OXYCODONE AND ACETAMINOPHEN 2 TABLET(S): 5; 325 TABLET ORAL at 13:11

## 2019-12-28 RX ADMIN — Medication 1 DROP(S): at 13:11
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
## 2019-12-28 NOTE — DISCHARGE NOTE NURSING/CASE MANAGEMENT/SOCIAL WORK - NSDCVIVACCINE_GEN_ALL_CORE_FT
Tdap , 2019/12/18 13:09 , Norma Galeano (TANK) Influenza , 2019/12/28 13:44 , Kristal Mccormack (RN)  Tdap , 2019/12/18 13:09 , Norma Galeano)

## 2019-12-28 NOTE — DISCHARGE NOTE NURSING/CASE MANAGEMENT/SOCIAL WORK - PATIENT PORTAL LINK FT
You can access the FollowMyHealth Patient Portal offered by [facility] by registering at the following website: [email or web address]. By joining [employer]’s FollowMyHealth portal, you will also be able to view your health information using other applications (apps) compatible with our system.

## 2019-12-28 NOTE — DISCHARGE NOTE NURSING/CASE MANAGEMENT/SOCIAL WORK - NSDCPNINST_GEN_ALL_CORE
wash areas gently with mild soap and water; apply adaptic and kerlix on grafted area (right arm) once a day; silvadene and adaptic on small area on right wrist; bacitracin above right eye on pink moist open tissue
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
## 2019-12-28 NOTE — DISCHARGE NOTE NURSING/CASE MANAGEMENT/SOCIAL WORK - NSDCFUADDAPPT_GEN_ALL_CORE_FT
Please call [phone] to make a follow up appointment within 1 week with Dr. Anthony or Dr. Tomas. Clinic is located at [address] [place] [address] on Tuesdays (2-4pm) or Thursdays (9am-1pm).
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
## 2019-12-29 LAB — SURGICAL PATHOLOGY STUDY: SIGNIFICANT CHANGE UP

## 2019-12-30 PROBLEM — R56.9 UNSPECIFIED CONVULSIONS: Chronic | Status: ACTIVE | Noted: 2019-12-18

## 2020-01-02 ENCOUNTER — APPOINTMENT (OUTPATIENT)
Dept: BURN CARE | Facility: CLINIC | Age: 25
End: 2020-01-02
Payer: COMMERCIAL

## 2020-01-02 ENCOUNTER — OUTPATIENT (OUTPATIENT)
Dept: OUTPATIENT SERVICES | Facility: HOSPITAL | Age: 25
LOS: 1 days | Discharge: HOME | End: 2020-01-02

## 2020-01-02 DIAGNOSIS — T23.371A: ICD-10-CM

## 2020-01-02 DIAGNOSIS — G40.909 EPILEPSY, UNSPECIFIED, NOT INTRACTABLE, W/OUT STATUS EPILEPTICUS: ICD-10-CM

## 2020-01-02 PROCEDURE — 99212 OFFICE O/P EST SF 10 MIN: CPT

## 2020-01-02 RX ORDER — LEVETIRACETAM 500 MG/1
500 TABLET, FILM COATED ORAL
Refills: 0 | Status: ACTIVE | COMMUNITY

## 2020-01-07 ENCOUNTER — OUTPATIENT (OUTPATIENT)
Dept: OUTPATIENT SERVICES | Facility: HOSPITAL | Age: 25
LOS: 1 days | Discharge: HOME | End: 2020-01-07

## 2020-01-07 ENCOUNTER — APPOINTMENT (OUTPATIENT)
Dept: BURN CARE | Facility: CLINIC | Age: 25
End: 2020-01-07
Payer: COMMERCIAL

## 2020-01-07 PROCEDURE — 16030 DRESS/DEBRID P-THICK BURN L: CPT

## 2020-01-07 PROCEDURE — 99213 OFFICE O/P EST LOW 20 MIN: CPT | Mod: 25
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
## 2020-01-09 DIAGNOSIS — T20.30XD BURN OF THIRD DEGREE OF HEAD, FACE, AND NECK, UNSPECIFIED SITE, SUBSEQUENT ENCOUNTER: ICD-10-CM

## 2020-01-09 DIAGNOSIS — T23.021D: ICD-10-CM

## 2020-01-09 DIAGNOSIS — T22.331D BURN OF THIRD DEGREE OF RIGHT UPPER ARM, SUBSEQUENT ENCOUNTER: ICD-10-CM
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
## 2020-01-16 ENCOUNTER — OUTPATIENT (OUTPATIENT)
Dept: OUTPATIENT SERVICES | Facility: HOSPITAL | Age: 25
LOS: 1 days | Discharge: HOME | End: 2020-01-16

## 2020-01-16 ENCOUNTER — APPOINTMENT (OUTPATIENT)
Dept: BURN CARE | Facility: CLINIC | Age: 25
End: 2020-01-16
Payer: COMMERCIAL

## 2020-01-16 PROCEDURE — 99213 OFFICE O/P EST LOW 20 MIN: CPT | Mod: 25

## 2020-01-16 PROCEDURE — 16025 DRESS/DEBRID P-THICK BURN M: CPT
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
## 2020-01-21 DIAGNOSIS — T20.30XA BURN OF THIRD DEGREE OF HEAD, FACE, AND NECK, UNSPECIFIED SITE, INITIAL ENCOUNTER: ICD-10-CM

## 2020-01-21 DIAGNOSIS — T22.331D BURN OF THIRD DEGREE OF RIGHT UPPER ARM, SUBSEQUENT ENCOUNTER: ICD-10-CM

## 2020-01-21 DIAGNOSIS — T20.30XD BURN OF THIRD DEGREE OF HEAD, FACE, AND NECK, UNSPECIFIED SITE, SUBSEQUENT ENCOUNTER: ICD-10-CM

## 2020-01-21 DIAGNOSIS — T22.331A BURN OF THIRD DEGREE OF RIGHT UPPER ARM, INITIAL ENCOUNTER: ICD-10-CM
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
## 2020-01-22 DIAGNOSIS — T23.371A BURN OF THIRD DEGREE OF RIGHT WRIST, INITIAL ENCOUNTER: ICD-10-CM

## 2020-01-22 DIAGNOSIS — T20.30XA BURN OF THIRD DEGREE OF HEAD, FACE, AND NECK, UNSPECIFIED SITE, INITIAL ENCOUNTER: ICD-10-CM

## 2020-01-22 DIAGNOSIS — Z94.5 SKIN TRANSPLANT STATUS: ICD-10-CM

## 2020-01-22 DIAGNOSIS — T22.331A BURN OF THIRD DEGREE OF RIGHT UPPER ARM, INITIAL ENCOUNTER: ICD-10-CM
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
## 2020-02-13 ENCOUNTER — OUTPATIENT (OUTPATIENT)
Dept: OUTPATIENT SERVICES | Facility: HOSPITAL | Age: 25
LOS: 1 days | Discharge: HOME | End: 2020-02-13

## 2020-02-13 ENCOUNTER — APPOINTMENT (OUTPATIENT)
Dept: BURN CARE | Facility: CLINIC | Age: 25
End: 2020-02-13
Payer: COMMERCIAL

## 2020-02-13 DIAGNOSIS — Z94.5 SKIN TRANSPLANT STATUS: ICD-10-CM
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
## 2020-02-13 DIAGNOSIS — Z78.9 OTHER SPECIFIED HEALTH STATUS: ICD-10-CM

## 2020-02-13 DIAGNOSIS — T20.30XA BURN OF THIRD DEGREE OF HEAD, FACE, AND NECK, UNSPECIFIED SITE, INITIAL ENCOUNTER: ICD-10-CM

## 2020-02-13 DIAGNOSIS — T22.30XA BURN OF THIRD DEGREE OF SHOULDER AND UPPER LIMB, EXCEPT WRIST AND HAND, UNSPECIFIED SITE, INITIAL ENCOUNTER: ICD-10-CM

## 2020-02-13 DIAGNOSIS — T20.30XA: ICD-10-CM

## 2020-02-13 DIAGNOSIS — T22.331A: ICD-10-CM

## 2020-02-13 PROCEDURE — 99212 OFFICE O/P EST SF 10 MIN: CPT

## 2020-02-13 RX ORDER — NORETHINDRONE ACETATE AND ETHINYL ESTRADIOL, AND FERROUS FUMARATE 1MG-20(24)
1-20 KIT ORAL
Refills: 0 | Status: ACTIVE | COMMUNITY

## 2020-02-13 RX ORDER — LEVETIRACETAM 500 MG/1
500 TABLET, FILM COATED ORAL
Refills: 0 | Status: ACTIVE | COMMUNITY

## 2020-02-13 RX ORDER — FELBAMATE 600 MG/1
600 TABLET ORAL
Refills: 0 | Status: ACTIVE | COMMUNITY

## 2020-02-13 RX ORDER — CLONAZEPAM 0.5 MG/1
0.5 TABLET ORAL
Refills: 0 | Status: ACTIVE | COMMUNITY

## 2020-02-13 NOTE — ASSESSMENT
[Burn Prevention] : burn prevention [Wound Care] : wound care [FreeTextEntry1] : Healed forehead and RUE with hypertrophic scarring \par \par Rec: silicone gel and compression therapy - pt has used both before \par CICA and Tubigrip provided - \par TBM for custom compression garment
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
## 2020-02-13 NOTE — PHYSICAL EXAM
[Size%: ______] : Size: [unfilled]% [0] : 0 out of 10 [Normal] : normal [Closed] : closed [Infected?] : Infected: No [None] : none [] : no [de-identified] : and A&D [de-identified] : right forehead and brow- healed with raised red scar\par \par right arm- raised red healed SG site \par \par  [TWNoteComboBox1] : adaptic

## 2020-04-09 ENCOUNTER — APPOINTMENT (OUTPATIENT)
Dept: BURN CARE | Facility: CLINIC | Age: 25
End: 2020-04-09

## 2021-11-29 NOTE — ANESTHESIA FOLLOW-UP NOTE - NSINTERVIEW_GEN_ALL_CORE
Interviewed and evaluated Staged Advancement Flap Text: The defect edges were debeveled with a #15 scalpel blade.  Given the location of the defect, shape of the defect and the proximity to free margins a staged advancement flap was deemed most appropriate.  Using a sterile surgical marker, an appropriate advancement flap was drawn incorporating the defect and placing the expected incisions within the relaxed skin tension lines where possible. The area thus outlined was incised deep to adipose tissue with a #15 scalpel blade.  The skin margins were undermined to an appropriate distance in all directions utilizing iris scissors.

## 2022-10-05 NOTE — PRE-OP CHECKLIST - ADVANCE DIRECTIVE ADDRESSED/READDRESSED
----- Message from Karla Goodwin sent at 10/5/2022 10:42 AM CDT -----  Regarding: mammo order  Patient is scheduled for a mammogram on 10/8/2022.    Would you please put an order in [facility] for a Mammo Screening Yousuf Bilateral ([id number]) with a diagnosis of Encounter for Screening Mammo (Z12.31)?  Also please choose Perform Add'l Diag Tests and/or Interventional Proc Per Radiologist so we can do any further testing needed.    Thank you for your help!     
Order for mammogram placed per protocol.   
done
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
## 2023-02-23 NOTE — PRE-ANESTHESIA EVALUATION ADULT - BMI (KG/M2)
Pt returned HST device. It was downloaded and forwarded data to the clinical specialist for scoring.    
34

## 2023-03-13 NOTE — PATIENT PROFILE ADULT - NSPROMEDSPATCH_GEN_A_NUR
Pharyngitis     BASIC INFORMATION  Description  Inflammation or infection of the pharynx. The pharynx is the hollow passage at the back of the throat. It is made up of the nasopharynx, which leads to the nose and oropharynx which leads to the mouth. The larynx (voice box) is located below the pharynx. Pharyngitis occurs in all age groups, but most often affects children.    Frequent Signs and Symptoms  Sore throat  Swallowing difficulty  Tickle or \"lump\" in the throat.  Fever  Swollen glands in the neck (sometimes)  Throat maybe red or covered with a white or grayish membrane (sometimes)  Body aches (sometimes)    Causes  Viral infection (most common cause) or bacterial infection (such as streptococcus). The germs are spread by person-to-person contact. Rarely, there may be other causes, such as irritation.    Risk increases with  Common cold, flu, or seasonal allergies.  Weak immune system due to illness or drugs.  Smoking or second-hand smoke.  Chronic illness, such as diabetes.  Close quarters, such as with  reccruits, in schools, and  centers.    Preventive Measures  Avoid close contact with anyone with a sore throat.  Avoid germs. Wash hands often, especially children.    Expected Outcomes  Most cases of viral infection clear up on their own in a week. Antibiotic drugs can successfully treat bacterial infections. Complications are rare.    Possible Complications  Airways may become blocked.  Abscess (pus-filled area of infection)   Rheumatic fever, scarlet fever, or glomerulonephritis, if pharyngitis is caused by streptococcal bacteria and does not receive adequate antibiotic treatment.     DIAGNOSIS & TREATMENT  General Measures  Your health care provider will do an exam of the throat, ears, nose, neck, and lungs. Medical tests may include blood study and throat culture (from a swab of the throat), or rapid strep test find the type of infection.   Treatment will include self-care  measures and antibiotic drugs for bacterial infections. Antibiotics will not help viral infections.  To relieve the sore throat, gargle frequently with warm or cold double-strength tea or warm salt water (mix on-half teaspoon of salt in one cup of water).   Wash hands often to help prevent the spread of germs to other family members. Avoid kissing or sharing cups or other utensils.     Medications  For minor discomfort, you may use nonprescription drugs such as ibuprofen. Don't give aspirin to a child.   Nonprescription throat lozenges (for patients over age 3) may help ease discomfort.  Antibiotic drugs are usually prescribed for bacterial infection. Finish entire cours of drugs even if symptoms improve.    Activity  Return to normal activities as symptoms improve. A person can no longer spread the germs if they have taken antibiotic drug for at least 24 hours.    Diet  Drink plenty of fluids. If swallowing solid food is painful, try a liquid or soft diet for a few days.    NOTIFY OUR OFFICE IF  You or a family member has symptoms of pharyngitis  The following occur during treatment  Breathing/swallowing difficulty or chest pain.  Fever worsens or severe headache develops.  Thick mucus drainage from the nose.  Cough that produces colored or bloody sputum.  Skin rash.  Dark urine.     Copyright © 2005 by [employer]. All rights reserved    Honey-lemon tea, gargles, elevate head of bed a night, cepacol lozenges.    New toothbrush in two days, if take antibiotic.    If take antibiotic, then:    Benefits and risks of antibiotic discussed to include nausea, vomiting, abdominal pain, diarrhea, yeast infection, and allergic reactions. Patient should stop medication if develops a rash. Should take probiotic (such as acidophilus, Florajen, Florastor, or lactobacillus) for 2 weeks.  Overuse/recurring antibiotics may result in antibiotic resistance (when bacteria don't respond to antibiotics).          Ibuprofen 200 mg every six  hours as needed for pain (take with food)  +/- tylenol 300 mg every six hours as needed.    Ensure hydration.    Strep culture results in three days.  a   medication patch(es) used
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
## 2024-01-20 ENCOUNTER — EMERGENCY (EMERGENCY)
Facility: HOSPITAL | Age: 29
LOS: 0 days | Discharge: HOME | End: 2024-01-20
Attending: EMERGENCY MEDICINE
Payer: COMMERCIAL

## 2024-01-20 VITALS
OXYGEN SATURATION: 98 % | RESPIRATION RATE: 19 BRPM | HEART RATE: 94 BPM | DIASTOLIC BLOOD PRESSURE: 108 MMHG | TEMPERATURE: 98 F | SYSTOLIC BLOOD PRESSURE: 133 MMHG
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
## 2024-01-20 DIAGNOSIS — S01.01XA LACERATION WITHOUT FOREIGN BODY OF SCALP, INITIAL ENCOUNTER: ICD-10-CM

## 2024-01-20 DIAGNOSIS — G40.909 EPILEPSY, UNSPECIFIED, NOT INTRACTABLE, WITHOUT STATUS EPILEPTICUS: ICD-10-CM
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
## 2024-01-20 PROCEDURE — 12001 RPR S/N/AX/GEN/TRNK 2.5CM/<: CPT

## 2024-01-20 PROCEDURE — 99284 EMERGENCY DEPT VISIT MOD MDM: CPT | Mod: 25

## 2024-01-20 PROCEDURE — 99282 EMERGENCY DEPT VISIT SF MDM: CPT | Mod: 25

## 2024-01-20 NOTE — ED ADULT TRIAGE NOTE - CHIEF COMPLAINT QUOTE
Pt states " I have history of Sz. I had one this morning. I hit my head and went to urgent care for tetanus shot. I noticed I have a open area behind my left ear"

## 2024-01-20 NOTE — ED PROVIDER NOTE - OBJECTIVE STATEMENT
Patient is a 29-year-old female history of epilepsy here for evaluation of laceration to left scalp status post seizure activity this morning.  Patient claims she probably had seizure because she did not have a good night sleep and took her antiepileptics earlier than she normally does.  Patient's last seizure aside from this was 1 year ago.  Patient has a neurologist that she follows up with regularly.  Patient went to urgent care today for tetanus shot and was sent to the ED for laceration repair.  Patient denies any acute symptoms at this time.

## 2024-01-20 NOTE — ED ADULT NURSE NOTE - NSFALLUNIVINTERV_ED_ALL_ED
Bed/Stretcher in lowest position, wheels locked, appropriate side rails in place/Call bell, personal items and telephone in reach/Instruct patient to call for assistance before getting out of bed/chair/stretcher/Non-slip footwear applied when patient is off stretcher/[place] to call system/Physically safe environment - no spills, clutter or unnecessary equipment/Purposeful proactive rounding/Room/bathroom lighting operational, light cord in reach
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
## 2024-01-20 NOTE — ED PROVIDER NOTE - NSFOLLOWUPINSTRUCTIONS_ED_ALL_ED_FT
return in 1 week for staple removal return in 1 week for staple removal      please follow up with your neurologist in 1- 3 days.     Laceration    A laceration is a cut that goes through all of the layers of the skin and into the tissue that is right under the skin. Some lacerations heal on their own. Others need to be closed with skin adhesive strips, skin glue, stitches (sutures), or staples. Proper laceration care minimizes the risk of infection and helps the laceration to heal better.  If non-absorbable stitches or staples have been placed, they must be taken out within the time frame instructed by your healthcare provider.    SEEK IMMEDIATE MEDICAL CARE IF YOU HAVE ANY OF THE FOLLOWING SYMPTOMS: swelling around the wound, worsening pain, drainage from the wound, red streaking going away from your wound, inability to move finger or toe near the laceration, or discoloration of skin near the laceration.    Staple Care    WHAT YOU NEED TO KNOW:    Staples are often used to close a wound. Your staples may be placed for 3 to 14 days, depending on the location of your wound.    DISCHARGE INSTRUCTIONS:    Care for your wound:     Clean:   You may be able to shower in 24 hours. Do not soak your wound under water.      Gently wash your wound with soap and warm water daily. Lightly pat it dry. Do not cover your wound unless your healthcare provider tells you to.       You may also need to clean your wound with a mixture of hydrogen peroxide and water. Ask how to do this.      Do not apply ointment or cream to the wound unless your healthcare provider tells you to.      Elevate:   Rest any arm or leg that has a wound on pillows above the level of your heart. Do this as often as possible for 2 days. This will help decrease swelling and pain, and help you heal faster.          Minimize scarring:   Avoid sunshine on your wound to reduce scarring.         Follow up with your healthcare provider as directed: You may need to return for a wound checkup 3 days after your staples are placed. Ask when you should return to get your staples removed.    Staple removal:     A medical staple remover will be used to take out your staples. Your healthcare provider will slide the tool under each staple, squeeze the handle, and gently pull the staple out.       Medical tape will be placed on your wound once your staples are removed. This will help keep your wound closed. The medical tape will fall off on its own after several days.     Contact your healthcare provider if:     You have redness, pain, swelling, and pus draining from your wound.      Your pain medicine does not relieve your pain.      You have a fever of 101°F (38.5°C) or higher.      You have an odor coming from your wound.      You have questions or concerns about your condition or care.    Return to the emergency department if:     Your wound reopens.      You have red streaks in your skin that spread out from your wound.      You have severe pain or vomiting.        Seizure    A seizure is abnormal electrical activity in the brain; the specific cause may or may not be found. Prior to a seizure you may experience a warning sensation (aura) that may include fear, nausea, dizziness, and visual changes such as flashing lights of spots. Common symptoms during the seizure may include an altered mental status, rhythmic jerking movements, drooling, grunting, loss of bladder or bowel control, or tongue biting. After a seizure, you may feel confused and sleepy.     Do not swim, drive, operate machinery, or engage in any risky activity during which a seizure could cause further injury to you or others. Teach friends and family what to do if you HAVE a seizure which includes laying you on the ground with your head on a cushion and turning you to the side to keep your breathing passages clear in case of vomiting.    SEEK IMMEDIATE MEDICAL CARE IF YOU HAVE ANY OF THE FOLLOWING SYMPTOMS: seizure lasting over 5 minutes, not waking up or persistent altered mental status after the seizure, or more frequent or worsening seizures.
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
## 2024-01-20 NOTE — ED PROVIDER NOTE - PHYSICAL EXAMINATION
VITAL SIGNS: I have reviewed nursing notes and confirm.  CONSTITUTIONAL: Well-developed; well-nourished  SKIN:  0.5cm lac to left scalp  HEAD: Normocephalic; atraumatic.  EYES: conjunctiva and sclera clear.  ENT: No nasal discharge; airway clear.  EXT: Normal ROM.  No clubbing, cyanosis or edema.   NEURO: Alert, oriented, grossly unremarkable

## 2024-01-20 NOTE — ED PROVIDER NOTE - PATIENT PORTAL LINK FT
You can access the FollowMyHealth Patient Portal offered by [facility] by registering at the following website: [email or web address]. By joining [employer]’s FollowMyHealth portal, you will also be able to view your health information using other applications (apps) compatible with our system.

## 2024-01-20 NOTE — ED PROVIDER NOTE - PROGRESS NOTE DETAILS
ED Attending PADDY Craig  Staple placed to scalp, patient aware of proper wound care, signs and symptoms to return for, will follow-up with PMD and neurologist, aware of staple removal here or with PMD.  Patient comfortable with plan with to follow-up as an outpatient. no seizure like activity in the ed, tolerated po.

## 2024-01-27 ENCOUNTER — EMERGENCY (EMERGENCY)
Facility: HOSPITAL | Age: 29
LOS: 0 days | Discharge: HOME | End: 2024-01-27
Attending: EMERGENCY MEDICINE
Payer: COMMERCIAL

## 2024-01-27 VITALS
HEART RATE: 73 BPM | TEMPERATURE: 98 F | OXYGEN SATURATION: 99 % | HEIGHT: 66 IN | RESPIRATION RATE: 18 BRPM | WEIGHT: 166.89 LBS | SYSTOLIC BLOOD PRESSURE: 130 MMHG | DIASTOLIC BLOOD PRESSURE: 78 MMHG

## 2024-01-27 DIAGNOSIS — Z48.02 ENCOUNTER FOR REMOVAL OF SUTURES: ICD-10-CM

## 2024-01-27 DIAGNOSIS — S01.91XD LACERATION WITHOUT FOREIGN BODY OF UNSPECIFIED PART OF HEAD, SUBSEQUENT ENCOUNTER: ICD-10-CM

## 2024-01-27 PROCEDURE — L9995: CPT

## 2024-01-27 PROCEDURE — 99212 OFFICE O/P EST SF 10 MIN: CPT

## 2024-01-27 NOTE — ED PROVIDER NOTE - NSFOLLOWUPINSTRUCTIONS_ED_ALL_ED_FT
FOLLOW UP WITH YOUR PRIMARY DOCTOR    Staple Care    WHAT YOU NEED TO KNOW:    How do I care for my wound?    Clean:  You may be able to shower in 24 hours. Do not soak your wound under water.    Gently wash your wound with soap and warm water daily. Lightly pat it dry. Do not cover your wound unless your healthcare provider tells you to.    You may also need to clean your wound with a mixture of hydrogen peroxide and water. Ask your healthcare provider how to do this.    Do not apply ointment or cream to the wound unless your healthcare provider tells you to.    Elevate:  Rest any arm or leg that has a wound on pillows above the level of your heart. Do this as often as possible for 2 days. This will help decrease swelling and pain.      Minimize scarring:  Avoid sunshine on your wound to reduce scarring.    When should I follow up with my healthcare provider? You may need to return for a wound checkup 3 days after your staples are placed. Ask your healthcare provider when to return to get your staples removed. Your healthcare provider will take your staples out as soon as possible to reduce scarring. Face staples may be removed within 3 to 5 days. Scalp, arm, and leg staples may be removed within 7 to 10 days. Joint, palm, and feet staples may be removed within 10 to 14 days.    How will my staples be removed?    A medical staple remover will be used to take out your staples. Your healthcare provider will slide the tool under each staple, squeeze the handle, and gently pull the staple out.    Medical tape will be placed on your wound once your staples are removed. This will help keep your wound closed. The medical tape will fall off on its own after several days.  When should I contact my healthcare provider?    You have redness, pain, swelling, or pus draining from your wound.    Your pain medicine does not relieve your pain.    You have a fever of 101°F (38.5°C) or higher.    You have an odor coming from your wound.    You have questions or concerns about your condition or care.  When should I seek immediate care?    Your wound reopens.    You have red streaks on your skin that spread out from your wound.    You have severe pain or vomiting.  CARE AGREEMENT:    You have the right to help plan your care. Learn about your health condition and how it may be treated. Discuss treatment options with your healthcare providers to decide what care you want to receive. You always have the right to refuse treatment.

## 2024-01-27 NOTE — ED PROVIDER NOTE - OBJECTIVE STATEMENT
patient is a 28yo female coming to ED for staple removal. pt was seen in ED 1/20/24 for laceration repair, had 1 staple placed. denies pain, fever, discharge.

## 2024-01-27 NOTE — ED PROVIDER NOTE - PATIENT PORTAL LINK FT
You can access the FollowMyHealth Patient Portal offered by [facility] by registering at the following website: [email or web address]. By joining [employer]’s FollowMyHealth portal, you will also be able to view your health information using other applications (apps) compatible with our system.

## 2024-01-27 NOTE — ED PROVIDER NOTE - PHYSICAL EXAMINATION
CONST: Well appearing in NAD  CARD: Normal S1 S2; Normal rate and rhythm  RESP: Equal BS B/L, No wheezes, rhonchi or rales. No distress  MS: Normal ROM in all extremities. No midline spinal tenderness.  SKIN: Warm, dry, no acute rashes. Good turgor. 1cm healing wound left temporal head  NEURO: A&Ox3, No focal deficits. Strength 5/5 with no sensory deficits. Steady gait

## 2024-01-27 NOTE — ED PROVIDER NOTE - ATTENDING APP SHARED VISIT CONTRIBUTION OF CARE
29-year-old female presents for staple removal.  Patient sustained laceration to head 1 week ago after having a seizure.  On exam patient in NAD, AOx3, 1 staple intact to left scalp

## 2024-10-19 ENCOUNTER — EMERGENCY (EMERGENCY)
Facility: HOSPITAL | Age: 29
LOS: 0 days | Discharge: HOME | End: 2024-10-19
Attending: EMERGENCY MEDICINE
Payer: COMMERCIAL

## 2024-10-19 VITALS
TEMPERATURE: 98 F | HEART RATE: 87 BPM | OXYGEN SATURATION: 99 % | HEIGHT: 66 IN | RESPIRATION RATE: 17 BRPM | SYSTOLIC BLOOD PRESSURE: 125 MMHG | WEIGHT: 207.9 LBS | DIASTOLIC BLOOD PRESSURE: 85 MMHG

## 2024-10-19 DIAGNOSIS — S92.514B: ICD-10-CM
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
## 2024-10-19 DIAGNOSIS — G40.909 EPILEPSY, UNSPECIFIED, NOT INTRACTABLE, WITHOUT STATUS EPILEPTICUS: ICD-10-CM
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
## 2024-10-19 DIAGNOSIS — Z23 ENCOUNTER FOR IMMUNIZATION: ICD-10-CM
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
## 2024-10-19 DIAGNOSIS — S91.114A LACERATION WITHOUT FOREIGN BODY OF RIGHT LESSER TOE(S) WITHOUT DAMAGE TO NAIL, INITIAL ENCOUNTER: ICD-10-CM

## 2024-10-19 LAB
ALBUMIN SERPL ELPH-MCNC: 4.5 G/DL — SIGNIFICANT CHANGE UP (ref 3.5–5.2)
ALP SERPL-CCNC: 66 U/L — SIGNIFICANT CHANGE UP (ref 30–115)
ALT FLD-CCNC: 18 U/L — SIGNIFICANT CHANGE UP (ref 0–41)
ANION GAP SERPL CALC-SCNC: 17 MMOL/L — HIGH (ref 7–14)
AST SERPL-CCNC: 22 U/L — SIGNIFICANT CHANGE UP (ref 0–41)
BASOPHILS # BLD AUTO: 0.07 K/UL — SIGNIFICANT CHANGE UP (ref 0–0.2)
BASOPHILS NFR BLD AUTO: 1 % — SIGNIFICANT CHANGE UP (ref 0–1)
BILIRUB SERPL-MCNC: <0.2 MG/DL — SIGNIFICANT CHANGE UP (ref 0.2–1.2)
BUN SERPL-MCNC: 6 MG/DL — LOW (ref 10–20)
CALCIUM SERPL-MCNC: 9.9 MG/DL — SIGNIFICANT CHANGE UP (ref 8.4–10.5)
CHLORIDE SERPL-SCNC: 101 MMOL/L — SIGNIFICANT CHANGE UP (ref 98–110)
CO2 SERPL-SCNC: 21 MMOL/L — SIGNIFICANT CHANGE UP (ref 17–32)
CREAT SERPL-MCNC: 0.6 MG/DL — LOW (ref 0.7–1.5)
EGFR: 125 ML/MIN/1.73M2 — SIGNIFICANT CHANGE UP
EOSINOPHIL # BLD AUTO: 0.19 K/UL — SIGNIFICANT CHANGE UP (ref 0–0.7)
EOSINOPHIL NFR BLD AUTO: 2.6 % — SIGNIFICANT CHANGE UP (ref 0–8)
GLUCOSE SERPL-MCNC: 95 MG/DL — SIGNIFICANT CHANGE UP (ref 70–99)
HCG SERPL QL: NEGATIVE — SIGNIFICANT CHANGE UP
HCT VFR BLD CALC: 44.6 % — SIGNIFICANT CHANGE UP (ref 37–47)
HGB BLD-MCNC: 14.5 G/DL — SIGNIFICANT CHANGE UP (ref 12–16)
IMM GRANULOCYTES NFR BLD AUTO: 0.3 % — SIGNIFICANT CHANGE UP (ref 0.1–0.3)
LYMPHOCYTES # BLD AUTO: 2.68 K/UL — SIGNIFICANT CHANGE UP (ref 1.2–3.4)
LYMPHOCYTES # BLD AUTO: 36.5 % — SIGNIFICANT CHANGE UP (ref 20.5–51.1)
MCHC RBC-ENTMCNC: 28.5 PG — SIGNIFICANT CHANGE UP (ref 27–31)
MCHC RBC-ENTMCNC: 32.5 G/DL — SIGNIFICANT CHANGE UP (ref 32–37)
MCV RBC AUTO: 87.6 FL — SIGNIFICANT CHANGE UP (ref 81–99)
MONOCYTES # BLD AUTO: 0.51 K/UL — SIGNIFICANT CHANGE UP (ref 0.1–0.6)
MONOCYTES NFR BLD AUTO: 6.9 % — SIGNIFICANT CHANGE UP (ref 1.7–9.3)
NEUTROPHILS # BLD AUTO: 3.87 K/UL — SIGNIFICANT CHANGE UP (ref 1.4–6.5)
NEUTROPHILS NFR BLD AUTO: 52.7 % — SIGNIFICANT CHANGE UP (ref 42.2–75.2)
NRBC # BLD: 0 /100 WBCS — SIGNIFICANT CHANGE UP (ref 0–0)
PLATELET # BLD AUTO: 318 K/UL — SIGNIFICANT CHANGE UP (ref 130–400)
PMV BLD: 11.4 FL — HIGH (ref 7.4–10.4)
POTASSIUM SERPL-MCNC: 4.7 MMOL/L — SIGNIFICANT CHANGE UP (ref 3.5–5)
POTASSIUM SERPL-SCNC: 4.7 MMOL/L — SIGNIFICANT CHANGE UP (ref 3.5–5)
PROT SERPL-MCNC: 7.7 G/DL — SIGNIFICANT CHANGE UP (ref 6–8)
RBC # BLD: 5.09 M/UL — SIGNIFICANT CHANGE UP (ref 4.2–5.4)
RBC # FLD: 13.2 % — SIGNIFICANT CHANGE UP (ref 11.5–14.5)
SODIUM SERPL-SCNC: 139 MMOL/L — SIGNIFICANT CHANGE UP (ref 135–146)
WBC # BLD: 7.34 K/UL — SIGNIFICANT CHANGE UP (ref 4.8–10.8)
WBC # FLD AUTO: 7.34 K/UL — SIGNIFICANT CHANGE UP (ref 4.8–10.8)

## 2024-10-19 PROCEDURE — 90471 IMMUNIZATION ADMIN: CPT

## 2024-10-19 PROCEDURE — 84703 CHORIONIC GONADOTROPIN ASSAY: CPT

## 2024-10-19 PROCEDURE — 99284 EMERGENCY DEPT VISIT MOD MDM: CPT

## 2024-10-19 PROCEDURE — 12013 RPR F/E/E/N/L/M 2.6-5.0 CM: CPT | Mod: GC

## 2024-10-19 PROCEDURE — 99284 EMERGENCY DEPT VISIT MOD MDM: CPT | Mod: 25

## 2024-10-19 PROCEDURE — 80053 COMPREHEN METABOLIC PANEL: CPT

## 2024-10-19 PROCEDURE — 99283 EMERGENCY DEPT VISIT LOW MDM: CPT | Mod: GC,25

## 2024-10-19 PROCEDURE — 85025 COMPLETE CBC W/AUTO DIFF WBC: CPT

## 2024-10-19 PROCEDURE — 96374 THER/PROPH/DIAG INJ IV PUSH: CPT | Mod: XU

## 2024-10-19 PROCEDURE — 36415 COLL VENOUS BLD VENIPUNCTURE: CPT

## 2024-10-19 PROCEDURE — 96372 THER/PROPH/DIAG INJ SC/IM: CPT | Mod: XU

## 2024-10-19 PROCEDURE — 73630 X-RAY EXAM OF FOOT: CPT | Mod: 26,RT

## 2024-10-19 PROCEDURE — 73630 X-RAY EXAM OF FOOT: CPT | Mod: RT

## 2024-10-19 PROCEDURE — 12041 INTMD RPR N-HF/GENIT 2.5CM/<: CPT

## 2024-10-19 PROCEDURE — 90715 TDAP VACCINE 7 YRS/> IM: CPT

## 2024-10-19 RX ORDER — TETANUS TOXOID, REDUCED DIPHTHERIA TOXOID AND ACELLULAR PERTUSSIS VACCINE, ADSORBED 5; 2.5; 8; 8; 2.5 [IU]/.5ML; [IU]/.5ML; UG/.5ML; UG/.5ML; UG/.5ML
0.5 SUSPENSION INTRAMUSCULAR ONCE
Refills: 0 | Status: COMPLETED | OUTPATIENT
Start: 2024-10-19 | End: 2024-10-19

## 2024-10-19 RX ORDER — KETOROLAC TROMETHAMINE 10 MG/1
30 TABLET, FILM COATED ORAL ONCE
Refills: 0 | Status: DISCONTINUED | OUTPATIENT
Start: 2024-10-19 | End: 2024-10-19

## 2024-10-19 RX ORDER — CEFAZOLIN SODIUM 1 G
1000 VIAL (EA) INJECTION ONCE
Refills: 0 | Status: COMPLETED | OUTPATIENT
Start: 2024-10-19 | End: 2024-10-19

## 2024-10-19 RX ORDER — CLINDAMYCIN PHOSPHATE 150 MG/ML
1 INJECTION, SOLUTION INTRAVENOUS
Qty: 21 | Refills: 0
Start: 2024-10-19 | End: 2024-10-25

## 2024-10-19 RX ADMIN — KETOROLAC TROMETHAMINE 30 MILLIGRAM(S): 10 TABLET, FILM COATED ORAL at 06:21

## 2024-10-19 RX ADMIN — TETANUS TOXOID, REDUCED DIPHTHERIA TOXOID AND ACELLULAR PERTUSSIS VACCINE, ADSORBED 0.5 MILLILITER(S): 5; 2.5; 8; 8; 2.5 SUSPENSION INTRAMUSCULAR at 05:58

## 2024-10-19 RX ADMIN — Medication 100 MILLIGRAM(S): at 07:33

## 2024-10-19 RX ADMIN — KETOROLAC TROMETHAMINE 30 MILLIGRAM(S): 10 TABLET, FILM COATED ORAL at 07:33

## 2024-10-19 NOTE — ED PROVIDER NOTE - PATIENT PORTAL LINK FT
You can access the FollowMyHealth Patient Portal offered by [facility] by registering at the following website: [email or web address]. By joining [employer]’s FollowMyHealth portal, you will also be able to view your health information using other applications (apps) compatible with our system.

## 2024-10-19 NOTE — ED PROVIDER NOTE - PROGRESS NOTE DETAILS
MS–consult placed to podiatry who will come and evaluate the patient in the emergency department.  Patient understands and agrees with plan.  Pending x-ray right foot.

## 2024-10-19 NOTE — ED PROVIDER NOTE - PHYSICAL EXAMINATION
VITAL SIGNS: I have reviewed nursing notes and confirm.  CONSTITUTIONAL: well-appearing, non-toxic, NAD  SKIN: Warm dry, normal skin turgor  HEAD: NCAT  EYES: EOMI, PERRLA, no scleral icterus  ENT: Moist mucous membranes, normal pharynx with no erythema or exudates  NECK: Supple; non tender. Full ROM.   CARD: RRR, no murmurs, rubs or gallops  RESP: clear to ausculation b/l.  No rales, rhonchi, or wheezing.  EXT: (+) 1.5 cm Laceration at the base of the right fifth toe, questionable tendon exposure.  Able to wiggle toes.  Neurovascularly intact.  Full range of motion of the right ankle, knee, hip.  NEURO: normal motor. normal sensory.   PSYCH: Cooperative, appropriate.

## 2024-10-19 NOTE — ED ADULT NURSE NOTE - OBJECTIVE STATEMENT
pt alert and oriented was walking in home and felt something sharp against toe, now present with laceration to 5th toe on right foot. sensation intact, able to move all toes and normal color present

## 2024-10-19 NOTE — ED PROVIDER NOTE - OBJECTIVE STATEMENT
Patient is a 29-year-old female past medical history of seizure disorder on Keppra and Felbatol presenting for evaluation of laceration to the right pinky toe.  Patient states that she was getting out of bed to look for her To throw something out when she may have stepped on a staple and sustained a laceration to her right fifth toe.  Patient did not take any medication for pain and came promptly to the emergency department for evaluation.  No other injuries.

## 2024-10-19 NOTE — CONSULT NOTE ADULT - SUBJECTIVE AND OBJECTIVE BOX
Podiatry Consult Note    Subjective:  JEREMY OLMEDO is a29 yr/o female who was seen bedside this morning for chief complaint of laceration to right 5th toe.   Patient states that she was walking downstairs this morning around 5am (without shoes) and felt a sharp pain and noticed bleeding from right 5th toe. She is unsure if it was staple on the stair or if she hit a sharp edge.   Denies any numbness or tingling   Mild pain   Patient has a cat and dog at home.   Tetanus shot administered in ED.           Review of Systems:  [X] Ten point review of systems is otherwise negative except as noted     Objective:  Vital Signs Last 24 Hrs  T(C): 36.8 (19 Oct 2024 05:24), Max: 36.8 (19 Oct 2024 05:24)  T(F): 98.3 (19 Oct 2024 05:24), Max: 98.3 (19 Oct 2024 05:24)  HR: 87 (19 Oct 2024 05:24) (87 - 87)  BP: 125/85 (19 Oct 2024 05:24) (125/85 - 125/85)  BP(mean): --  RR: 17 (19 Oct 2024 05:24) (17 - 17)  SpO2: 99% (19 Oct 2024 05:24) (99% - 99%)    Parameters below as of 19 Oct 2024 05:24  Patient On (Oxygen Delivery Method): room air                            14.5   7.34  )-----------( 318      ( 19 Oct 2024 07:45 )             44.6                       Physical Exam - Lower Extremity Focused:   Derm:   -Deep laceration to plantar medial aspect of 5th digit of right foot, down to level of subcutaneous tissue. No tendon exposure. No bone exposure. No purulent drainage or malodor.   -Mild active bleeding. Mild josé-wound necrosis noted to most lateral aspect of laceration.   -Ecchymosis noted to lateral aspect of 5th digit.   -Mild edema throughout right 5th digit.   Vascular: DP and PT Pulses Intact 2/4; Foot is Warm to Warm to the touch; Capillary Refill Time < 3 Seconds;    Neuro: Protective Sensation Intact.   MSK: Mild pain on palpation to right 5th digit. Patient is able to move digits without difficulty.     Right Foot XR:   -Fifth proximal phalangeal base nondisplaced intra-articular fracture. No radiopaque soft tissue foreign body    Assessment:  Right 5th digit Laceration    Plan:  Chart reviewed and Patient evaluated. All Questions and Concerns Addressed and Answered  XR Imaging  Foot; Reviewed and stated above.   Repair of laceration via aseptic technique with 4-0 vicryl deep sutures and 3-0 nylon for skin reapproximation. Patient tolerated procedure well. 5cc of 1% Lidocaine plain injected prior to procedure.   Local Wound Care; Open laceration flushed thoroughly with bulb syringe with vashe. Wound dressed with xeroform, gauze, yandel and ace bandage.   Weight Bearing Status; WBAT w/ Surgical Shoe;   Rx Clindamycin for abx   Patient to follow up in Dr. Manrique's outpatient clinic.   Discussed Plan w/ Dr. Manrique     Podiatry  Podiatry Consult Note    Subjective:  JEREMY OLMEDO is a29 yr/o female who was seen bedside this morning for chief complaint of laceration to right 5th toe.   Patient states that she was walking downstairs this morning around 5am (without shoes) and felt a sharp pain and noticed bleeding from right 5th toe. She is unsure if it was staple on the stair or if she hit a sharp edge.   Denies any numbness or tingling   Mild pain   Patient has a cat and dog at home.   Tetanus shot administered in ED.           Review of Systems:  [X] Ten point review of systems is otherwise negative except as noted     Objective:  Vital Signs Last 24 Hrs  T(C): 36.8 (19 Oct 2024 05:24), Max: 36.8 (19 Oct 2024 05:24)  T(F): 98.3 (19 Oct 2024 05:24), Max: 98.3 (19 Oct 2024 05:24)  HR: 87 (19 Oct 2024 05:24) (87 - 87)  BP: 125/85 (19 Oct 2024 05:24) (125/85 - 125/85)  BP(mean): --  RR: 17 (19 Oct 2024 05:24) (17 - 17)  SpO2: 99% (19 Oct 2024 05:24) (99% - 99%)    Parameters below as of 19 Oct 2024 05:24  Patient On (Oxygen Delivery Method): room air                            14.5   7.34  )-----------( 318      ( 19 Oct 2024 07:45 )             44.6                       Physical Exam - Lower Extremity Focused:   Derm:   -Deep laceration to plantar medial aspect of 5th digit of right foot, down to level of deep tissue and fascia . No tendon exposure/rupture . No bone exposure. No purulent drainage or malodor.   -Mild active bleeding. Mild josé-wound necrosis noted to most lateral aspect of laceration.   -Ecchymosis noted to lateral aspect of 5th digit.   -Mild edema throughout right 5th digit.   Vascular: DP and PT Pulses Intact 2/4; Foot is Warm to Warm to the touch; Capillary Refill Time < 3 Seconds;    Neuro: Protective Sensation Intact.   MSK: Mild pain on palpation to right 5th digit. Patient is able to move digits without difficulty.     Right Foot XR:   -Fifth proximal phalangeal base nondisplaced intra-articular fracture. No radiopaque soft tissue foreign body    Assessment:  Right 5th digit Laceration    Plan:  Chart reviewed and Patient evaluated. All Questions and Concerns Addressed and Answered  XR Imaging  Foot; Reviewed and stated above.   Repair of laceration via aseptic technique with 4-0 vicryl deep sutures and 3-0 nylon for skin reapproximation. Patient tolerated procedure well. 5cc of 1% Lidocaine plain injected prior to procedure.   Local Wound Care; Open laceration flushed thoroughly with bulb syringe with vashe. Wound dressed with xeroform, gauze, yandel and ace bandage.   Weight Bearing Status; WBAT w/ Surgical Shoe;   Rx Clindamycin for abx   Patient to follow up in Dr. Manrique's outpatient clinic.   Discussed Plan w/ Dr. Manrique     Podiatry

## 2024-10-19 NOTE — ED PROVIDER NOTE - ATTENDING CONTRIBUTION TO CARE
no edema,  no murmurs,  regular rate and rhythm , no edema. 28 yo female, PMHx of epilepsy, presenting for right 5th toe laceration.  She states she was walking down the stairs and felt something sharp. Denies trauma elsewhere, weakness, numbness.    CONSTITUTIONAL: Well-developed; well-nourished; in no acute distress.   SKIN: warm, dry  HEAD: Normocephalic; atraumatic.  EXT: Normal ROM.  No clubbing, cyanosis or edema. +right 5th toe laceration at base on sole surface extending to medial and lateral side. possible tendon exposure. able to wiggle toe. no active bleeding. capillary refill <2 second.  NEURO: Alert, oriented, grossly unremarkable  PSYCH: Cooperative, appropriate.    podiatry consulted and to evaluate patient

## 2024-10-19 NOTE — ED PROVIDER NOTE - NSFOLLOWUPINSTRUCTIONS_ED_ALL_ED_FT
Keep foot dry  Keep elevated for comfort.     May shower with bag over right leg.     Follow up with podiatry one week as advises by podiatry .

## 2024-10-19 NOTE — ED PROVIDER NOTE - CARE PROVIDER_API CALL
Little Manrique  Podiatric Medicine and Surgery  [address] [place] [address]  Phone: [phone]  Fax: [phone]  Follow Up Time: 7-10 Days

## 2024-10-19 NOTE — ED PROVIDER NOTE - CLINICAL SUMMARY MEDICAL DECISION MAKING FREE TEXT BOX
Pt accepted from Dr. Avila. Pt injured right foot on metal at home and cut planter aspect of proximal 5th toe. x-ray showed fracture. Podiatry resident is at bedside repairing wound. Advised clindamycin. ICG clindamycin. Open shoe.  Follow up in one week.

## 2024-10-19 NOTE — CONSULT NOTE ADULT - ATTENDING COMMENTS
Agree with above plan  Repair of 3cm laceration to deep tissue and fascia R 5th toe  Keep dressing dry clean and intact  Cont abx  F/u as o/p
